# Patient Record
Sex: MALE | Race: OTHER | HISPANIC OR LATINO | URBAN - METROPOLITAN AREA
[De-identification: names, ages, dates, MRNs, and addresses within clinical notes are randomized per-mention and may not be internally consistent; named-entity substitution may affect disease eponyms.]

---

## 2018-02-12 ENCOUNTER — EMERGENCY (EMERGENCY)
Facility: HOSPITAL | Age: 58
LOS: 1 days | Discharge: HOME | End: 2018-02-12
Attending: EMERGENCY MEDICINE | Admitting: EMERGENCY MEDICINE
Payer: OTHER MISCELLANEOUS

## 2018-02-12 VITALS
SYSTOLIC BLOOD PRESSURE: 153 MMHG | HEART RATE: 50 BPM | TEMPERATURE: 98 F | DIASTOLIC BLOOD PRESSURE: 89 MMHG | OXYGEN SATURATION: 100 % | RESPIRATION RATE: 16 BRPM

## 2018-02-12 PROCEDURE — 73110 X-RAY EXAM OF WRIST: CPT | Mod: 26,LT

## 2018-02-12 PROCEDURE — 99282 EMERGENCY DEPT VISIT SF MDM: CPT

## 2018-02-12 PROCEDURE — 73130 X-RAY EXAM OF HAND: CPT | Mod: 26,LT

## 2018-02-12 RX ORDER — ACETAMINOPHEN 500 MG
650 TABLET ORAL ONCE
Qty: 0 | Refills: 0 | Status: COMPLETED | OUTPATIENT
Start: 2018-02-12 | End: 2018-02-12

## 2018-02-12 RX ORDER — IBUPROFEN 200 MG
600 TABLET ORAL ONCE
Qty: 0 | Refills: 0 | Status: COMPLETED | OUTPATIENT
Start: 2018-02-12 | End: 2018-02-12

## 2018-02-12 NOTE — ED PROVIDER NOTE - PLAN OF CARE
Follow up with your primary physician for a post hospital visit within 48 hours, taking all results from the ER to be reviewed.  You can use an ACE wrap for comfort purposes if needed. For pain control take tylenol as directed. Set up a follow up to be seen by a hand specilaist, names and numbers provided. If any sensation changes, weakness in the extremity, , worsening, concerning, or new signs or symptoms return ot the ER Follow up with your primary physician for a post hospital visit within 48 hours, taking all results from the ER to be reviewed.  You can use an ACE wrap for comfort purposes if needed. For pain control take Tylenol as directed. Set up a follow up to be seen by a hand specialist, names and numbers provided. If any sensation changes, weakness in the extremity, , worsening, concerning, or new signs or symptoms return ot the ER

## 2018-02-12 NOTE — ED PROVIDER NOTE - MEDICAL DECISION MAKING DETAILS
58 y/o M w/ left wrist pain s/p twisting injury at work. Normal exam. Plan: XR wrist and hand, pain meds and reassess.

## 2018-02-12 NOTE — ED PROVIDER NOTE - OBJECTIVE STATEMENT
56 y/o M w/ PMHx of HTN on losartan and asthma, presents to the ED c/o left wrist pain s/p twisting injury at work this morning. Pt works as an , states he was twisting wires and had a sudden onset of left wrist pain. Pt endorses similar sx in the past, but not as severe. No pain meds taken for sx.  Pt is right hand dominant. Denies numbness/tingling or any other complaints. NKDA.

## 2018-02-12 NOTE — ED PROVIDER NOTE - PROGRESS NOTE DETAILS
Wrist pain sp twisting injury at work- xray pending, will give pain control and re-eveal Declined pain meds. No pain at current time.  Xray no acute fx or dislocation hand or wrist. periarticular erosions, suggestive of RA. Will dc home with hand fu. pain control prn

## 2018-02-12 NOTE — ED PROVIDER NOTE - CARE PLAN
Principal Discharge DX:	Wrist pain  Assessment and plan of treatment:	Follow up with your primary physician for a post hospital visit within 48 hours, taking all results from the ER to be reviewed.  You can use an ACE wrap for comfort purposes if needed. For pain control take tylenol as directed. Set up a follow up to be seen by a hand specilaist, names and numbers provided. If any sensation changes, weakness in the extremity, , worsening, concerning, or new signs or symptoms return ot the ER Principal Discharge DX:	Wrist pain  Assessment and plan of treatment:	Follow up with your primary physician for a post hospital visit within 48 hours, taking all results from the ER to be reviewed.  You can use an ACE wrap for comfort purposes if needed. For pain control take Tylenol as directed. Set up a follow up to be seen by a hand specialist, names and numbers provided. If any sensation changes, weakness in the extremity, , worsening, concerning, or new signs or symptoms return ot the ER

## 2018-06-29 PROBLEM — Z00.00 ENCOUNTER FOR PREVENTIVE HEALTH EXAMINATION: Status: ACTIVE | Noted: 2018-06-29

## 2018-07-12 ENCOUNTER — RECORD ABSTRACTING (OUTPATIENT)
Age: 58
End: 2018-07-12

## 2018-07-12 DIAGNOSIS — Z82.49 FAMILY HISTORY OF ISCHEMIC HEART DISEASE AND OTHER DISEASES OF THE CIRCULATORY SYSTEM: ICD-10-CM

## 2018-07-12 DIAGNOSIS — L29.9 PRURITUS, UNSPECIFIED: ICD-10-CM

## 2018-07-12 RX ORDER — ERGOCALCIFEROL 1.25 MG/1
1.25 MG CAPSULE ORAL
Refills: 0 | Status: ACTIVE | COMMUNITY

## 2018-07-18 ENCOUNTER — APPOINTMENT (OUTPATIENT)
Dept: PULMONOLOGY | Facility: CLINIC | Age: 58
End: 2018-07-18
Payer: COMMERCIAL

## 2018-07-18 VITALS
OXYGEN SATURATION: 95 % | HEART RATE: 53 BPM | WEIGHT: 279 LBS | SYSTOLIC BLOOD PRESSURE: 156 MMHG | HEIGHT: 61 IN | BODY MASS INDEX: 52.67 KG/M2 | DIASTOLIC BLOOD PRESSURE: 83 MMHG | RESPIRATION RATE: 16 BRPM

## 2018-07-18 PROCEDURE — 99213 OFFICE O/P EST LOW 20 MIN: CPT

## 2018-10-10 ENCOUNTER — APPOINTMENT (OUTPATIENT)
Dept: PULMONOLOGY | Facility: CLINIC | Age: 58
End: 2018-10-10
Payer: COMMERCIAL

## 2018-10-10 VITALS
WEIGHT: 282 LBS | OXYGEN SATURATION: 97 % | TEMPERATURE: 98.8 F | HEIGHT: 61 IN | DIASTOLIC BLOOD PRESSURE: 86 MMHG | SYSTOLIC BLOOD PRESSURE: 138 MMHG | RESPIRATION RATE: 16 BRPM | HEART RATE: 74 BPM | BODY MASS INDEX: 53.24 KG/M2

## 2018-10-10 DIAGNOSIS — Z23 ENCOUNTER FOR IMMUNIZATION: ICD-10-CM

## 2018-10-10 PROCEDURE — 90686 IIV4 VACC NO PRSV 0.5 ML IM: CPT

## 2018-10-10 PROCEDURE — G0008: CPT

## 2018-10-10 PROCEDURE — 99213 OFFICE O/P EST LOW 20 MIN: CPT | Mod: 25

## 2019-01-09 ENCOUNTER — APPOINTMENT (OUTPATIENT)
Dept: PULMONOLOGY | Facility: CLINIC | Age: 59
End: 2019-01-09
Payer: COMMERCIAL

## 2019-01-09 VITALS
DIASTOLIC BLOOD PRESSURE: 81 MMHG | OXYGEN SATURATION: 97 % | HEART RATE: 60 BPM | SYSTOLIC BLOOD PRESSURE: 145 MMHG | TEMPERATURE: 98.7 F

## 2019-01-09 PROCEDURE — 99213 OFFICE O/P EST LOW 20 MIN: CPT | Mod: 25

## 2019-01-09 PROCEDURE — 88738 HGB QUANT TRANSCUTANEOUS: CPT

## 2019-01-09 PROCEDURE — 94727 GAS DIL/WSHOT DETER LNG VOL: CPT

## 2019-01-09 PROCEDURE — 94060 EVALUATION OF WHEEZING: CPT

## 2019-01-09 PROCEDURE — 94729 DIFFUSING CAPACITY: CPT

## 2019-01-10 LAB — POCT - HEMOGLOBIN (HGB), QUANTITATIVE, TRANSCUTANEOUS: 13.9

## 2019-01-10 NOTE — PHYSICAL EXAM
[General Appearance - Well Developed] : well developed [Normal Appearance] : normal appearance [Well Groomed] : well groomed [General Appearance - Well Nourished] : well nourished [No Deformities] : no deformities [General Appearance - In No Acute Distress] : no acute distress [Heart Rate And Rhythm] : heart rate and rhythm were normal [Heart Sounds] : normal S1 and S2 [Murmurs] : no murmurs present [Respiration, Rhythm And Depth] : normal respiratory rhythm and effort [Exaggerated Use Of Accessory Muscles For Inspiration] : no accessory muscle use [Auscultation Breath Sounds / Voice Sounds] : lungs were clear to auscultation bilaterally [Abdomen Soft] : soft [Abdomen Tenderness] : non-tender [Abdomen Mass (___ Cm)] : no abdominal mass palpated [Nail Clubbing] : no clubbing of the fingernails [Cyanosis, Localized] : no localized cyanosis [] : no ischemic changes [Petechial Hemorrhages (___cm)] : no petechial hemorrhages

## 2019-01-10 NOTE — PROCEDURE
[FreeTextEntry1] : APAP compliance reviewed with pt\par \par Pulmonary Function Test: Lung Volume: Within normal limits; Spirometry: Within normal limits with no improvement post bronchodilator; Diffusion: Within normal limits.\par

## 2019-01-10 NOTE — REASON FOR VISIT
[Follow-Up] : a follow-up visit [Shortness of Breath] : shortness of Breath [Hypersomnolence] : hypersomnolence  [Sleep Apnea] : sleep apnea [FreeTextEntry2] : weight gain,  BLUE

## 2019-01-10 NOTE — ASSESSMENT
[FreeTextEntry1] : Patient is compliant and benefiting from APAP usage.\par Advised pt on weight loss, and Cardio f/u with Dr. Matthews

## 2019-04-02 ENCOUNTER — APPOINTMENT (OUTPATIENT)
Dept: PULMONOLOGY | Facility: CLINIC | Age: 59
End: 2019-04-02
Payer: COMMERCIAL

## 2019-04-02 PROCEDURE — 99214 OFFICE O/P EST MOD 30 MIN: CPT | Mod: 25

## 2019-04-02 PROCEDURE — 94729 DIFFUSING CAPACITY: CPT

## 2019-04-02 PROCEDURE — 88738 HGB QUANT TRANSCUTANEOUS: CPT

## 2019-04-02 PROCEDURE — 94727 GAS DIL/WSHOT DETER LNG VOL: CPT

## 2019-04-02 PROCEDURE — 94060 EVALUATION OF WHEEZING: CPT

## 2019-04-02 NOTE — PROCEDURE
[FreeTextEntry1] : Pulmonary Function Test: Lung Volume: Within normal limits; Spirometry: Within normal limits with some improvement post bronchodilator; Diffusion: Within normal limits.\par \par Auto-titrating Positive Airway Pressure(APAP) compliance reviewed with patient in office today\par

## 2019-04-02 NOTE — REASON FOR VISIT
[Follow-Up] : a follow-up visit [Hypersomnolence] : hypersomnolence  [Shortness of Breath] : shortness of Breath [Sleep Apnea] : sleep apnea

## 2019-07-03 ENCOUNTER — RX RENEWAL (OUTPATIENT)
Age: 59
End: 2019-07-03

## 2019-07-10 ENCOUNTER — APPOINTMENT (OUTPATIENT)
Dept: PULMONOLOGY | Facility: CLINIC | Age: 59
End: 2019-07-10
Payer: COMMERCIAL

## 2019-07-10 VITALS
OXYGEN SATURATION: 98 % | WEIGHT: 281 LBS | HEIGHT: 73.5 IN | SYSTOLIC BLOOD PRESSURE: 148 MMHG | TEMPERATURE: 98.6 F | BODY MASS INDEX: 36.45 KG/M2 | HEART RATE: 62 BPM | RESPIRATION RATE: 16 BRPM | DIASTOLIC BLOOD PRESSURE: 88 MMHG

## 2019-07-10 PROCEDURE — 99213 OFFICE O/P EST LOW 20 MIN: CPT

## 2019-07-11 NOTE — PROCEDURE
[FreeTextEntry1] : APAP data downloaded and discussed with patient.\par \par Patient benefiting and compliant with CPAP.

## 2019-07-11 NOTE — PHYSICAL EXAM
[Normal Appearance] : normal appearance [General Appearance - Well Developed] : well developed [General Appearance - Well Nourished] : well nourished [Well Groomed] : well groomed [No Deformities] : no deformities [Heart Sounds] : normal S1 and S2 [General Appearance - In No Acute Distress] : no acute distress [Heart Rate And Rhythm] : heart rate was normal and rhythm regular [Murmurs] : no murmurs [Heart Sounds Pericardial Friction Rub] : no pericardial rub [Heart Sounds Gallop] : no gallops [Auscultation Breath Sounds / Voice Sounds] : lungs were clear to auscultation bilaterally [Nail Clubbing] : no clubbing of the fingernails [Cyanosis, Localized] : no localized cyanosis [Petechial Hemorrhages (___cm)] : no petechial hemorrhages [] : no ischemic changes

## 2019-07-16 ENCOUNTER — RX RENEWAL (OUTPATIENT)
Age: 59
End: 2019-07-16

## 2019-10-09 ENCOUNTER — APPOINTMENT (OUTPATIENT)
Dept: PULMONOLOGY | Facility: CLINIC | Age: 59
End: 2019-10-09
Payer: COMMERCIAL

## 2019-10-09 VITALS
HEIGHT: 73.5 IN | SYSTOLIC BLOOD PRESSURE: 142 MMHG | BODY MASS INDEX: 36.19 KG/M2 | HEART RATE: 60 BPM | RESPIRATION RATE: 15 BRPM | OXYGEN SATURATION: 98 % | WEIGHT: 279 LBS | DIASTOLIC BLOOD PRESSURE: 80 MMHG

## 2019-10-09 PROCEDURE — 99213 OFFICE O/P EST LOW 20 MIN: CPT

## 2019-10-10 NOTE — PHYSICAL EXAM
[General Appearance - Well Developed] : well developed [Normal Appearance] : normal appearance [Well Groomed] : well groomed [General Appearance - Well Nourished] : well nourished [No Deformities] : no deformities [General Appearance - In No Acute Distress] : no acute distress [Heart Rate And Rhythm] : heart rate was normal and rhythm regular [Heart Sounds] : normal S1 and S2 [Murmurs] : no murmurs [Heart Sounds Gallop] : no gallops [Heart Sounds Pericardial Friction Rub] : no pericardial rub [Auscultation Breath Sounds / Voice Sounds] : lungs were clear to auscultation bilaterally [Nail Clubbing] : no clubbing of the fingernails [Cyanosis, Localized] : no localized cyanosis [] : no ischemic changes [Petechial Hemorrhages (___cm)] : no petechial hemorrhages

## 2019-10-10 NOTE — PROCEDURE
[FreeTextEntry1] : APAP data downloaded and discussed with patient.\par \par Patient compliant and benefiting from APAP use.\par

## 2020-01-27 ENCOUNTER — APPOINTMENT (OUTPATIENT)
Dept: PULMONOLOGY | Facility: CLINIC | Age: 60
End: 2020-01-27
Payer: COMMERCIAL

## 2020-01-27 VITALS
RESPIRATION RATE: 16 BRPM | HEART RATE: 53 BPM | WEIGHT: 288 LBS | DIASTOLIC BLOOD PRESSURE: 83 MMHG | BODY MASS INDEX: 37.36 KG/M2 | OXYGEN SATURATION: 98 % | SYSTOLIC BLOOD PRESSURE: 143 MMHG | HEIGHT: 73.5 IN

## 2020-01-27 LAB
ALBUMIN SERPL ELPH-MCNC: 5 G/DL
ALP BLD-CCNC: 76 U/L
ALT SERPL-CCNC: 23 U/L
ANION GAP SERPL CALC-SCNC: 14 MMOL/L
AST SERPL-CCNC: 18 U/L
BASOPHILS # BLD AUTO: 0.03 K/UL
BASOPHILS NFR BLD AUTO: 0.6 %
BILIRUB SERPL-MCNC: 0.3 MG/DL
BUN SERPL-MCNC: 13 MG/DL
CALCIUM SERPL-MCNC: 9.5 MG/DL
CHLORIDE SERPL-SCNC: 102 MMOL/L
CO2 SERPL-SCNC: 25 MMOL/L
CREAT SERPL-MCNC: 0.97 MG/DL
EOSINOPHIL # BLD AUTO: 0.19 K/UL
EOSINOPHIL NFR BLD AUTO: 3.6 %
ERYTHROCYTE [SEDIMENTATION RATE] IN BLOOD BY WESTERGREN METHOD: 29 MM/HR
GLUCOSE SERPL-MCNC: 102 MG/DL
HCT VFR BLD CALC: 41.2 %
HGB BLD-MCNC: 14.3 G/DL
IMM GRANULOCYTES NFR BLD AUTO: 0.8 %
LYMPHOCYTES # BLD AUTO: 2.05 K/UL
LYMPHOCYTES NFR BLD AUTO: 38.7 %
MAN DIFF?: NORMAL
MCHC RBC-ENTMCNC: 30.3 PG
MCHC RBC-ENTMCNC: 34.7 GM/DL
MCV RBC AUTO: 87.3 FL
MONOCYTES # BLD AUTO: 0.5 K/UL
MONOCYTES NFR BLD AUTO: 9.4 %
NEUTROPHILS # BLD AUTO: 2.49 K/UL
NEUTROPHILS NFR BLD AUTO: 46.9 %
PLATELET # BLD AUTO: 206 K/UL
POCT - HEMOGLOBIN (HGB), QUANTITATIVE, TRANSCUTANEOUS: 13.7
POTASSIUM SERPL-SCNC: 4.1 MMOL/L
PROT SERPL-MCNC: 7.6 G/DL
RBC # BLD: 4.72 M/UL
RBC # FLD: 13 %
SODIUM SERPL-SCNC: 142 MMOL/L
WBC # FLD AUTO: 5.3 K/UL

## 2020-01-27 PROCEDURE — 88738 HGB QUANT TRANSCUTANEOUS: CPT

## 2020-01-27 PROCEDURE — 36415 COLL VENOUS BLD VENIPUNCTURE: CPT

## 2020-01-27 PROCEDURE — 99214 OFFICE O/P EST MOD 30 MIN: CPT | Mod: 25

## 2020-01-27 PROCEDURE — 95012 NITRIC OXIDE EXP GAS DETER: CPT

## 2020-01-27 PROCEDURE — 94729 DIFFUSING CAPACITY: CPT

## 2020-01-27 PROCEDURE — 71046 X-RAY EXAM CHEST 2 VIEWS: CPT

## 2020-01-27 PROCEDURE — 94727 GAS DIL/WSHOT DETER LNG VOL: CPT

## 2020-01-27 PROCEDURE — 94060 EVALUATION OF WHEEZING: CPT

## 2020-01-28 NOTE — REASON FOR VISIT
[Follow-Up] : a follow-up visit [Hypersomnolence] : hypersomnolence  [Cough] : cough [Sleep Apnea] : sleep apnea

## 2020-01-28 NOTE — PROCEDURE
[FreeTextEntry1] : APAP data downloaded and discussed with patient.\par \par Patient compliant and benefiting from APAP use.\par \par Chest x-ray PA and lateral views performed in my office today showed clear lungs, no evidence of infiltrates or pleural effusions.\par \par Pulmonary Function Test: Lung Volume: Within normal limits; Spirometry: Within normal limits with some improvement post bronchodilator; Diffusion: Within normal limits.\par

## 2020-01-28 NOTE — PHYSICAL EXAM
[General Appearance - Well Developed] : well developed [Normal Appearance] : normal appearance [Well Groomed] : well groomed [General Appearance - Well Nourished] : well nourished [No Deformities] : no deformities [Heart Rate And Rhythm] : heart rate was normal and rhythm regular [General Appearance - In No Acute Distress] : no acute distress [Heart Sounds] : normal S1 and S2 [Heart Sounds Gallop] : no gallops [Murmurs] : no murmurs [Heart Sounds Pericardial Friction Rub] : no pericardial rub [Nail Clubbing] : no clubbing of the fingernails [Auscultation Breath Sounds / Voice Sounds] : lungs were clear to auscultation bilaterally [Cyanosis, Localized] : no localized cyanosis [Petechial Hemorrhages (___cm)] : no petechial hemorrhages [] : no ischemic changes

## 2020-01-28 NOTE — ASSESSMENT
[FreeTextEntry1] : Start Z-Costa and Medrol Dosepak for acute asthmatic bronchitis\par Patient is compliant and benefiting from APAP usage.\par \par

## 2020-02-03 LAB
ANION GAP SERPL CALC-SCNC: 19 MMOL/L
BUN SERPL-MCNC: 13 MG/DL
CALCIUM SERPL-MCNC: 9.5 MG/DL
CHLORIDE SERPL-SCNC: 103 MMOL/L
CO2 SERPL-SCNC: 21 MMOL/L
CREAT SERPL-MCNC: 0.93 MG/DL
DEPRECATED D DIMER PPP IA-ACNC: <150 NG/ML DDU
GLUCOSE SERPL-MCNC: 103 MG/DL
POTASSIUM SERPL-SCNC: 4.1 MMOL/L
SODIUM SERPL-SCNC: 143 MMOL/L

## 2020-02-10 ENCOUNTER — APPOINTMENT (OUTPATIENT)
Dept: PULMONOLOGY | Facility: CLINIC | Age: 60
End: 2020-02-10
Payer: COMMERCIAL

## 2020-02-10 VITALS
RESPIRATION RATE: 16 BRPM | SYSTOLIC BLOOD PRESSURE: 147 MMHG | WEIGHT: 288 LBS | HEIGHT: 73.5 IN | BODY MASS INDEX: 37.36 KG/M2 | DIASTOLIC BLOOD PRESSURE: 76 MMHG | HEART RATE: 55 BPM | OXYGEN SATURATION: 97 %

## 2020-02-10 PROCEDURE — 94060 EVALUATION OF WHEEZING: CPT

## 2020-02-10 PROCEDURE — 99214 OFFICE O/P EST MOD 30 MIN: CPT | Mod: 25

## 2020-02-10 NOTE — REASON FOR VISIT
[Cough] : cough [Hypersomnolence] : hypersomnolence  [Follow-Up] : a follow-up visit [Sleep Apnea] : sleep apnea [FreeTextEntry2] : cough is significantly better at this point

## 2020-02-10 NOTE — PHYSICAL EXAM
[Normal Appearance] : normal appearance [General Appearance - Well Developed] : well developed [Well Groomed] : well groomed [No Deformities] : no deformities [General Appearance - Well Nourished] : well nourished [Heart Sounds] : normal S1 and S2 [General Appearance - In No Acute Distress] : no acute distress [Heart Rate And Rhythm] : heart rate was normal and rhythm regular [Heart Sounds Gallop] : no gallops [Murmurs] : no murmurs [Heart Sounds Pericardial Friction Rub] : no pericardial rub [Auscultation Breath Sounds / Voice Sounds] : lungs were clear to auscultation bilaterally [Nail Clubbing] : no clubbing of the fingernails [Petechial Hemorrhages (___cm)] : no petechial hemorrhages [Cyanosis, Localized] : no localized cyanosis [] : no ischemic changes

## 2020-09-18 ENCOUNTER — RX RENEWAL (OUTPATIENT)
Age: 60
End: 2020-09-18

## 2020-09-29 ENCOUNTER — LABORATORY RESULT (OUTPATIENT)
Age: 60
End: 2020-09-29

## 2020-09-29 ENCOUNTER — APPOINTMENT (OUTPATIENT)
Dept: PULMONOLOGY | Facility: CLINIC | Age: 60
End: 2020-09-29
Payer: COMMERCIAL

## 2020-09-29 VITALS
WEIGHT: 295 LBS | SYSTOLIC BLOOD PRESSURE: 131 MMHG | BODY MASS INDEX: 39.1 KG/M2 | HEART RATE: 51 BPM | TEMPERATURE: 98 F | DIASTOLIC BLOOD PRESSURE: 76 MMHG | OXYGEN SATURATION: 95 % | HEIGHT: 73 IN

## 2020-09-29 PROCEDURE — 36415 COLL VENOUS BLD VENIPUNCTURE: CPT

## 2020-09-29 PROCEDURE — 71046 X-RAY EXAM CHEST 2 VIEWS: CPT

## 2020-09-29 PROCEDURE — 99215 OFFICE O/P EST HI 40 MIN: CPT | Mod: 25

## 2020-09-29 NOTE — REASON FOR VISIT
[Follow-Up] : a follow-up visit [Hypersomnolence] : hypersomnolence [Shortness of Breath] : shortness of breath [TextBox_44] : Complains of exertional dyspnea

## 2020-09-29 NOTE — PROCEDURE
[FreeTextEntry1] : \par  Xray Chest 2 Views PA/Lat             Final\par \par No Documents Attached\par \par \par \par \par   \par Chest x-ray PA and lateral views performed in my office today showed mild pulmonary vascular congestion/CHF, no evidence of pleural effusions. \par \par \par  Ordered by: ЮЛИЯ AQUINO       Collected/Examined: 29Sep2020 06:39PM       \par Verified by: ЮЛИЯ AQUINO 29Sep2020 06:39PM       \par  Result Communication: No patient communication needed at this time;\par Stage: Final       \par  Performed at: In Office       Performed by: ЮЛИЯ AQUINO       Resulted: 29Sep2020 06:39PM       Last Updated: 29Sep2020 06:39PM       Accession: [id number]

## 2020-09-29 NOTE — ASSESSMENT
[FreeTextEntry1] : Start Lasix 20 mg p.o. daily\par Medications reviewed. Continue present medications.\par Venipuncture with labs drawn in office\par Auto-titrating Positive Airway Pressure(APAP) compliance reviewed with patient in office today\par Patient is compliant and benefiting from APAP usage.\par Return in 1 week for follow-up

## 2020-10-07 ENCOUNTER — APPOINTMENT (OUTPATIENT)
Dept: PULMONOLOGY | Facility: CLINIC | Age: 60
End: 2020-10-07
Payer: COMMERCIAL

## 2020-10-07 VITALS
RESPIRATION RATE: 15 BRPM | TEMPERATURE: 98.1 F | HEART RATE: 56 BPM | OXYGEN SATURATION: 95 % | SYSTOLIC BLOOD PRESSURE: 147 MMHG | DIASTOLIC BLOOD PRESSURE: 85 MMHG

## 2020-10-07 PROCEDURE — 99214 OFFICE O/P EST MOD 30 MIN: CPT

## 2020-10-08 NOTE — HISTORY OF PRESENT ILLNESS
[TextBox_4] : 1wk f/u, was started on 20mg of Lasix after noted fluid around the heart. Does state some improvement in dyspnea since starting Lasix but still having dyspnea with exertion.

## 2020-10-15 LAB
ALBUMIN SERPL ELPH-MCNC: 4.7 G/DL
ALP BLD-CCNC: 84 U/L
ALT SERPL-CCNC: 34 U/L
ANION GAP SERPL CALC-SCNC: 14 MMOL/L
AST SERPL-CCNC: 30 U/L
BASOPHILS # BLD AUTO: 0.04 K/UL
BASOPHILS NFR BLD AUTO: 0.6 %
BILIRUB SERPL-MCNC: 0.3 MG/DL
BUN SERPL-MCNC: 19 MG/DL
CALCIUM SERPL-MCNC: 10 MG/DL
CHLORIDE SERPL-SCNC: 107 MMOL/L
CK MB BLD-MCNC: 6.1 NG/ML
CK SERPL-CCNC: 390 U/L
CO2 SERPL-SCNC: 25 MMOL/L
CREAT SERPL-MCNC: 1.06 MG/DL
DEPRECATED D DIMER PPP IA-ACNC: <150 NG/ML DDU
EOSINOPHIL # BLD AUTO: 0.2 K/UL
EOSINOPHIL NFR BLD AUTO: 3.2 %
ERYTHROCYTE [SEDIMENTATION RATE] IN BLOOD BY WESTERGREN METHOD: 7 MM/HR
GLUCOSE SERPL-MCNC: 91 MG/DL
HCT VFR BLD CALC: 40.2 %
HGB BLD-MCNC: 13.3 G/DL
IMM GRANULOCYTES NFR BLD AUTO: 0.6 %
LYMPHOCYTES # BLD AUTO: 2.47 K/UL
LYMPHOCYTES NFR BLD AUTO: 39.5 %
MAN DIFF?: NORMAL
MCHC RBC-ENTMCNC: 30.2 PG
MCHC RBC-ENTMCNC: 33.1 GM/DL
MCV RBC AUTO: 91.2 FL
MONOCYTES # BLD AUTO: 0.67 K/UL
MONOCYTES NFR BLD AUTO: 10.7 %
NEUTROPHILS # BLD AUTO: 2.83 K/UL
NEUTROPHILS NFR BLD AUTO: 45.4 %
NT-PROBNP SERPL-MCNC: 16 PG/ML
PLATELET # BLD AUTO: 207 K/UL
POTASSIUM SERPL-SCNC: 4 MMOL/L
PROT SERPL-MCNC: 7.4 G/DL
RBC # BLD: 4.41 M/UL
RBC # FLD: 13.2 %
SARS-COV-2 IGG SERPL IA-ACNC: <0.1 INDEX
SARS-COV-2 IGG SERPL QL IA: NEGATIVE
SODIUM SERPL-SCNC: 146 MMOL/L
WBC # FLD AUTO: 6.25 K/UL

## 2020-11-24 ENCOUNTER — APPOINTMENT (OUTPATIENT)
Dept: PULMONOLOGY | Facility: CLINIC | Age: 60
End: 2020-11-24
Payer: COMMERCIAL

## 2020-11-24 VITALS
DIASTOLIC BLOOD PRESSURE: 77 MMHG | TEMPERATURE: 98.1 F | SYSTOLIC BLOOD PRESSURE: 154 MMHG | HEART RATE: 60 BPM | OXYGEN SATURATION: 98 % | RESPIRATION RATE: 15 BRPM

## 2020-11-24 PROCEDURE — 99214 OFFICE O/P EST MOD 30 MIN: CPT | Mod: 25

## 2020-11-24 PROCEDURE — 71046 X-RAY EXAM CHEST 2 VIEWS: CPT

## 2020-11-24 NOTE — PROCEDURE
[FreeTextEntry1] : Auto-titrating Positive Airway Pressure(APAP) compliance reviewed with patient in office today\par \par  Xray Chest 2 Views PA/Lat             Final\par   \par Chest x-ray PA and lateral views performed in my office today showed clear lungs, no evidence of infiltrates or pleural effusions. \par \par \par  Ordered by: ЮЛИЯ AQUINO       Collected/Examined: 24Nov2020 03:44PM       \par Verification Required       Stage: Final       \par  Performed at: In Office       Performed by: ЮЛИЯ AQUINO       Resulted: 24Nov2020 03:44PM       Last Updated: 24Nov2020 03:44PM

## 2020-11-24 NOTE — ASSESSMENT
[FreeTextEntry1] : Patient is compliant and benefiting from APAP usage.\par Medications reviewed. Continue present medications.\par

## 2020-11-24 NOTE — REASON FOR VISIT
[Follow-Up] : a follow-up visit [Cough] : cough [Shortness of Breath] : shortness of breath [TextBox_44] : Had cough and SOB earlier for 1 week, but symptoms resolved now

## 2021-01-26 ENCOUNTER — APPOINTMENT (OUTPATIENT)
Dept: PULMONOLOGY | Facility: CLINIC | Age: 61
End: 2021-01-26
Payer: COMMERCIAL

## 2021-01-26 VITALS
HEART RATE: 55 BPM | OXYGEN SATURATION: 98 % | DIASTOLIC BLOOD PRESSURE: 85 MMHG | TEMPERATURE: 98.2 F | SYSTOLIC BLOOD PRESSURE: 135 MMHG | RESPIRATION RATE: 15 BRPM

## 2021-01-26 PROCEDURE — 99072 ADDL SUPL MATRL&STAF TM PHE: CPT

## 2021-01-26 PROCEDURE — 99214 OFFICE O/P EST MOD 30 MIN: CPT

## 2021-03-24 ENCOUNTER — APPOINTMENT (OUTPATIENT)
Dept: PULMONOLOGY | Facility: CLINIC | Age: 61
End: 2021-03-24
Payer: COMMERCIAL

## 2021-03-24 VITALS
DIASTOLIC BLOOD PRESSURE: 78 MMHG | RESPIRATION RATE: 15 BRPM | SYSTOLIC BLOOD PRESSURE: 138 MMHG | HEART RATE: 53 BPM | OXYGEN SATURATION: 97 % | TEMPERATURE: 98.6 F

## 2021-03-24 PROCEDURE — 99072 ADDL SUPL MATRL&STAF TM PHE: CPT

## 2021-03-24 PROCEDURE — 99214 OFFICE O/P EST MOD 30 MIN: CPT

## 2021-03-24 NOTE — PHYSICAL EXAM
[General Appearance - Well Developed] : well developed [Normal Appearance] : normal appearance [Well Groomed] : well groomed [General Appearance - Well Nourished] : well nourished [No Deformities] : no deformities [General Appearance - In No Acute Distress] : no acute distress [Heart Rate And Rhythm] : heart rate was normal and rhythm regular [Heart Sounds] : normal S1 and S2 [Heart Sounds Gallop] : no gallops [Murmurs] : no murmurs [Heart Sounds Pericardial Friction Rub] : no pericardial rub [Auscultation Breath Sounds / Voice Sounds] : lungs were clear to auscultation bilaterally [Nail Clubbing] : no clubbing of the fingernails [Cyanosis, Localized] : no localized cyanosis [Petechial Hemorrhages (___cm)] : no petechial hemorrhages [] : no ischemic changes

## 2021-03-25 NOTE — HISTORY OF PRESENT ILLNESS
[FreeTextEntry1] : States overall feeling OK\par Does complain of some SOB\par States does not believe it is pulmonary related

## 2021-05-26 ENCOUNTER — EMERGENCY (EMERGENCY)
Facility: HOSPITAL | Age: 61
LOS: 1 days | Discharge: HOME | End: 2021-05-26
Admitting: EMERGENCY MEDICINE
Payer: COMMERCIAL

## 2021-05-26 VITALS
OXYGEN SATURATION: 98 % | DIASTOLIC BLOOD PRESSURE: 84 MMHG | TEMPERATURE: 99 F | SYSTOLIC BLOOD PRESSURE: 157 MMHG | HEART RATE: 99 BPM | RESPIRATION RATE: 18 BRPM

## 2021-05-26 PROCEDURE — 99284 EMERGENCY DEPT VISIT MOD MDM: CPT

## 2021-05-26 NOTE — ED ADULT TRIAGE NOTE - CHIEF COMPLAINT QUOTE
"I bit into a rib bone and my tooth is loose, I don't want to go to sleep and then swallow it" no bleeding, no pain

## 2021-05-27 NOTE — ED PROVIDER NOTE - NSFOLLOWUPINSTRUCTIONS_ED_ALL_ED_FT
Follow up with your dentist within 3-5 days  Take Tylenol 650mg every 6 hours as needed for pain  Soft diet  Return to the ER with any worsening or concerning symptoms, pain, swelling, fever or any other concerns.

## 2021-05-27 NOTE — PROGRESS NOTE ADULT - SUBJECTIVE AND OBJECTIVE BOX
Dental ED Consult    MEDICATIONS  (STANDING):None    MEDICATIONS  (PRN):None    Allergies    No Known Drug Allergies  shellfish (Anaphylaxis; Short breath)    Intolerances    Vital Signs Last 24 Hrs  T(C): 37.2 (26 May 2021 22:12), Max: 37.2 (26 May 2021 22:12)  T(F): 98.9 (26 May 2021 22:12), Max: 98.9 (26 May 2021 22:12)  HR: 99 (26 May 2021 22:12) (99 - 99)  BP: 157/84 (26 May 2021 22:12) (157/84 - 157/84)  BP(mean): --  RR: 18 (26 May 2021 22:12) (18 - 18)  SpO2: 98% (26 May 2021 22:12) (98% - 98%)    LABS:N/A    Patient was eating ribs when he noticed a tooth in the LR quad was mobile. Patient was concerned about swallowing it  CLINICAL EXAM:  EOE:WNL  IOE:#29 implant crown mobile    DENTAL RADIOGRAPHS:#29/30 implants w/ crowns, #29 does not appear to be fully seated    RADIOLOGY & ADDITIONAL TESTS:N/A    ASSESSMENT:Patient requires routine dental care to address loose implant crown  PLAN: Follow up w/ outside dentist    PROCEDURE: Limited exam 1 PA  Verbal consent given.    RECOMMENDATIONS:  1) F/U with outpatient dentist for comprehensive dental care upon discharge.  2) If swelling, fever, difficulty breathing/swallowing occurs, page Dental.     Kimani Lawrence DDS, Pager #[phone]

## 2021-05-27 NOTE — ED PROVIDER NOTE - PROGRESS NOTE DETAILS
LAVERN Bauman: Spoke with dental will see pt in the ED LAVERN Bauman: Pt seen by dental, will d/c home with private dental follow up, as tooth is an implant

## 2021-05-27 NOTE — ED PROVIDER NOTE - PATIENT PORTAL LINK FT
You can access the FollowMyHealth Patient Portal offered by [facility] by registering at the following website: [email or web address]. By joining [facility]’s FollowMyHealth portal, you will also be able to view your health information using other applications (apps) compatible with our system.

## 2021-05-27 NOTE — ED PROVIDER NOTE - OBJECTIVE STATEMENT
61yM w/pmhx HTN, asthma presenting with loose tooth. Pt states he was eating ribs when he felt his right lower tooth came loose. Pt is concerned he will swallow it in his sleep 61yM w/pmhx HTN, asthma presenting with loose tooth. Pt states he was eating ribs when he felt his right lower tooth come loose. Pt is concerned he will swallow it in his sleep. Pt denies pain, gum swelling, facial swelling, headache, fever/chills, neck pain or any other concerns.

## 2021-05-27 NOTE — ED PROVIDER NOTE - CLINICAL SUMMARY MEDICAL DECISION MAKING FREE TEXT BOX
61yM w/pmhx HTN, asthma presenting with loose tooth after eating ribs. On exam tooth #28 is loose, no obvious fracture, no gum or facial swelling. Pt denies pain at present, well appearing. Will discuss with dental.

## 2021-06-02 ENCOUNTER — APPOINTMENT (OUTPATIENT)
Dept: PULMONOLOGY | Facility: CLINIC | Age: 61
End: 2021-06-02
Payer: COMMERCIAL

## 2021-06-02 VITALS
TEMPERATURE: 97.9 F | BODY MASS INDEX: 41.75 KG/M2 | OXYGEN SATURATION: 95 % | WEIGHT: 315 LBS | HEIGHT: 73 IN | DIASTOLIC BLOOD PRESSURE: 77 MMHG | SYSTOLIC BLOOD PRESSURE: 148 MMHG | HEART RATE: 63 BPM

## 2021-06-02 LAB
BASOPHILS # BLD AUTO: 0.03 K/UL
BASOPHILS NFR BLD AUTO: 0.5 %
DEPRECATED D DIMER PPP IA-ACNC: <150 NG/ML DDU
EOSINOPHIL # BLD AUTO: 0.13 K/UL
EOSINOPHIL NFR BLD AUTO: 2.3 %
HCT VFR BLD CALC: 43.6 %
HGB BLD-MCNC: 14.1 G/DL
IMM GRANULOCYTES NFR BLD AUTO: 0.5 %
LYMPHOCYTES # BLD AUTO: 1.56 K/UL
LYMPHOCYTES NFR BLD AUTO: 27.5 %
MAN DIFF?: NORMAL
MCHC RBC-ENTMCNC: 29 PG
MCHC RBC-ENTMCNC: 32.3 GM/DL
MCV RBC AUTO: 89.5 FL
MONOCYTES # BLD AUTO: 0.69 K/UL
MONOCYTES NFR BLD AUTO: 12.1 %
NEUTROPHILS # BLD AUTO: 3.24 K/UL
NEUTROPHILS NFR BLD AUTO: 57.1 %
PLATELET # BLD AUTO: 159 K/UL
RBC # BLD: 4.87 M/UL
RBC # FLD: 13.4 %
TROPONIN-T, HIGH SENSITIVITY: 21 NG/L
WBC # FLD AUTO: 5.68 K/UL

## 2021-06-02 PROCEDURE — 95012 NITRIC OXIDE EXP GAS DETER: CPT

## 2021-06-02 PROCEDURE — 99214 OFFICE O/P EST MOD 30 MIN: CPT | Mod: 25

## 2021-06-02 PROCEDURE — 99072 ADDL SUPL MATRL&STAF TM PHE: CPT

## 2021-06-02 PROCEDURE — 94010 BREATHING CAPACITY TEST: CPT

## 2021-06-02 PROCEDURE — 71046 X-RAY EXAM CHEST 2 VIEWS: CPT

## 2021-06-03 NOTE — REASON FOR VISIT
[Follow-Up] : a follow-up visit [Shortness of Breath] : shortness of breath [TextBox_44] : Complains of shortness of breath for 1 year, no chest pain or cough.

## 2021-06-03 NOTE — ASSESSMENT
[FreeTextEntry1] : Venipuncture with labs drawn in office\par Start Z-Costa and Medrol Dosepak.\par Start Asmanex 200 mcg HFA, 2 puffs twice a day.\par Return for pulmonary follow-up in 2 weeks.

## 2021-06-03 NOTE — PROCEDURE
[FreeTextEntry1] : Spirometry showed suggestive evidence of moderate restrictive defect.\par \par  Exhaled Nitric Oxide             Final\par \par No Documents Attached\par \par \par   Test   Result   Flag Reference Goal \par   Exhaled Nitric Oxide 15      \par \par  Ordered by: ЮЛИЯ AQUINO       Collected/Examined: 02Jun2021 09:59AM       \par Verified by: ЮЛИЯ AQUINO 02Jun2021 05:12PM       \par  Result Communication: No patient communication needed at this time;\par Stage: Final       \par  Performed at: Other       Performed by: ЮЛИЯ AQUINO       Resulted: 02Jun2021 09:59AM       Last Updated: 02Jun2021 05:12PM       Accession: [id number]       \par \par  Xray Chest 2 Views PA/Lat             Final\par \par \par   \par Chest x-ray PA and lateral views performed in my office today showed chronically elevated right hemidiaphragm, otherwise clear lungs, no evidence of infiltrates or pleural effusions. \par \par \par  Ordered by: ЮЛИЯ AQUINO       Collected/Examined: 02Jun2021 10:08AM       \par Verified by: ЮЛИЯ AQUINO 02Jun2021 05:12PM       \par  Result Communication: No patient communication needed at this time;\par Stage: Final       \par  Performed at: In Office       Performed by: ЮЛИЯ AQUINO       Resulted: 02Jun2021 10:08AM       Last Updated: 02Jun2021 05:12PM       Accession: [id number]

## 2021-06-03 NOTE — DISCUSSION/SUMMARY
[FreeTextEntry1] : Brent is a patient with chronic shortness of breath likely secondary to asthmatic bronchitis, rule out PE/MI.

## 2021-06-07 LAB
ANION GAP SERPL CALC-SCNC: 16 MMOL/L
BUN SERPL-MCNC: 11 MG/DL
CALCIUM SERPL-MCNC: 9.1 MG/DL
CHLORIDE SERPL-SCNC: 104 MMOL/L
CO2 SERPL-SCNC: 24 MMOL/L
CREAT SERPL-MCNC: 0.96 MG/DL
GLUCOSE SERPL-MCNC: 144 MG/DL
POTASSIUM SERPL-SCNC: 3.8 MMOL/L
SODIUM SERPL-SCNC: 143 MMOL/L

## 2021-06-23 ENCOUNTER — APPOINTMENT (OUTPATIENT)
Dept: PULMONOLOGY | Facility: CLINIC | Age: 61
End: 2021-06-23
Payer: COMMERCIAL

## 2021-06-23 VITALS
TEMPERATURE: 97.8 F | SYSTOLIC BLOOD PRESSURE: 136 MMHG | OXYGEN SATURATION: 95 % | HEIGHT: 73 IN | DIASTOLIC BLOOD PRESSURE: 71 MMHG | HEART RATE: 58 BPM

## 2021-06-23 PROCEDURE — 99072 ADDL SUPL MATRL&STAF TM PHE: CPT

## 2021-06-23 PROCEDURE — 99214 OFFICE O/P EST MOD 30 MIN: CPT

## 2021-06-23 NOTE — REASON FOR VISIT
[Follow-Up] : a follow-up visit [Asthma] : asthma [Shortness of Breath] : shortness of breath [TextBox_44] : Recurrent SOB after medrol pack,-did not take Asmanex HFA as advised.

## 2021-06-23 NOTE — ASSESSMENT
[FreeTextEntry1] : Start Z-Costa and Medrol Dosepak.\par Start Asmanex 200 mcg HFA, 2 puffs twice a day.\par Return for pulmonary follow-up in 2 weeks.\par Patient is compliant and benefiting from APAP usage.\par Medications reviewed. Continue present medications.\par

## 2021-07-07 ENCOUNTER — APPOINTMENT (OUTPATIENT)
Dept: PULMONOLOGY | Facility: CLINIC | Age: 61
End: 2021-07-07
Payer: COMMERCIAL

## 2021-07-07 VITALS
DIASTOLIC BLOOD PRESSURE: 69 MMHG | OXYGEN SATURATION: 96 % | SYSTOLIC BLOOD PRESSURE: 139 MMHG | TEMPERATURE: 97.6 F | HEART RATE: 60 BPM

## 2021-07-07 PROCEDURE — 99072 ADDL SUPL MATRL&STAF TM PHE: CPT

## 2021-07-07 PROCEDURE — 95012 NITRIC OXIDE EXP GAS DETER: CPT

## 2021-07-07 PROCEDURE — 99214 OFFICE O/P EST MOD 30 MIN: CPT | Mod: 25

## 2021-07-07 PROCEDURE — 94010 BREATHING CAPACITY TEST: CPT

## 2021-07-07 NOTE — PHYSICAL EXAM
[General Appearance - Well Developed] : well developed [Normal Appearance] : normal appearance [Well Groomed] : well groomed [General Appearance - Well Nourished] : well nourished [No Deformities] : no deformities [General Appearance - In No Acute Distress] : no acute distress [Heart Sounds] : normal S1 and S2 [Heart Rate And Rhythm] : heart rate was normal and rhythm regular [Heart Sounds Gallop] : no gallops [Murmurs] : no murmurs [Heart Sounds Pericardial Friction Rub] : no pericardial rub [Auscultation Breath Sounds / Voice Sounds] : lungs were clear to auscultation bilaterally [Nail Clubbing] : no clubbing of the fingernails [Cyanosis, Localized] : no localized cyanosis [Petechial Hemorrhages (___cm)] : no petechial hemorrhages [] : no ischemic changes

## 2021-07-08 NOTE — ASSESSMENT
[FreeTextEntry1] : Patient is compliant and benefiting from APAP usage.\par Discussed with Brent regarding [employer] recalls, advised him to go on website to get new replacement.\par His Dreamstation has defects, will change to ResMed at 5 to 15 cm H2O.\par Decrease Asmanex HFA to 1 puff twice daily.  \par Medications reviewed. Continue present medications.\par

## 2021-07-08 NOTE — PROCEDURE
[FreeTextEntry1] : Spirometry shows suggestive evidence of mild restrictive defect.\par \par  Exhaled Nitric Oxide             Final\par \par No Documents Attached\par \par \par   Test   Result   Flag Reference Goal Last Verified \par   Exhaled Nitric Oxide 13      REQUIRED \par \par  Ordered by: ЮЛИЯ AQUINO       Collected/Examined: [id number] 10:22AM       \par Verification Required       Stage: Final       \par  Performed at: Other       Performed by: ЮЛИЯ AQUINO       Resulted: [id number] 10:22AM       Last Updated: [id number] 10:22AM       \par

## 2021-08-12 ENCOUNTER — APPOINTMENT (OUTPATIENT)
Dept: PULMONOLOGY | Facility: CLINIC | Age: 61
End: 2021-08-12
Payer: COMMERCIAL

## 2021-08-12 VITALS
HEART RATE: 53 BPM | DIASTOLIC BLOOD PRESSURE: 78 MMHG | OXYGEN SATURATION: 95 % | SYSTOLIC BLOOD PRESSURE: 146 MMHG | TEMPERATURE: 98.3 F

## 2021-08-12 PROCEDURE — 94727 GAS DIL/WSHOT DETER LNG VOL: CPT

## 2021-08-12 PROCEDURE — 99214 OFFICE O/P EST MOD 30 MIN: CPT | Mod: 25

## 2021-08-12 PROCEDURE — 94010 BREATHING CAPACITY TEST: CPT

## 2021-08-12 PROCEDURE — 94729 DIFFUSING CAPACITY: CPT

## 2021-08-12 PROCEDURE — 88738 HGB QUANT TRANSCUTANEOUS: CPT

## 2021-08-12 PROCEDURE — ZZZZZ: CPT

## 2021-08-15 NOTE — PROCEDURE
[FreeTextEntry1] : PFT performed the office today: Spirometry shows suggestive evidence of mild restrictive defect; lung volume is within normal limits with air trapping; diffusion is within normal limits.

## 2021-08-15 NOTE — ASSESSMENT
[FreeTextEntry1] : Continue Asmanex HFA\par Patient is compliant and benefiting from APAP usage.\par

## 2021-10-20 ENCOUNTER — APPOINTMENT (OUTPATIENT)
Dept: PULMONOLOGY | Facility: CLINIC | Age: 61
End: 2021-10-20
Payer: COMMERCIAL

## 2021-10-20 VITALS
DIASTOLIC BLOOD PRESSURE: 79 MMHG | OXYGEN SATURATION: 96 % | SYSTOLIC BLOOD PRESSURE: 136 MMHG | TEMPERATURE: 97.7 F | HEART RATE: 59 BPM | RESPIRATION RATE: 15 BRPM

## 2021-10-20 LAB — SARS-COV-2 AG RESP QL IA.RAPID: NEGATIVE

## 2021-10-20 PROCEDURE — 88738 HGB QUANT TRANSCUTANEOUS: CPT

## 2021-10-20 PROCEDURE — 94729 DIFFUSING CAPACITY: CPT

## 2021-10-20 PROCEDURE — ZZZZZ: CPT

## 2021-10-20 PROCEDURE — 94010 BREATHING CAPACITY TEST: CPT

## 2021-10-20 PROCEDURE — 36415 COLL VENOUS BLD VENIPUNCTURE: CPT

## 2021-10-20 PROCEDURE — 94727 GAS DIL/WSHOT DETER LNG VOL: CPT

## 2021-10-20 PROCEDURE — 71046 X-RAY EXAM CHEST 2 VIEWS: CPT

## 2021-10-20 PROCEDURE — 99214 OFFICE O/P EST MOD 30 MIN: CPT | Mod: 25

## 2021-10-20 PROCEDURE — 87811 SARS-COV-2 COVID19 W/OPTIC: CPT

## 2021-10-20 NOTE — ASSESSMENT
[FreeTextEntry1] : Venipuncture with labs drawn in office\par Will switch from Asmanex to Dulera 200/25 mcg HFA, 2 puffs twice daily at this point.\par Return for pulmonary follow-up in 2 weeks.

## 2021-10-20 NOTE — PROCEDURE
[FreeTextEntry1] : Pulmonary function test performed in my office today: Spirometry shows suggestive evidence of mild restrictive defect; lung volume is within normal limits; diffusion is within normal limits.\par \par \par  POCT COVID-19 (Binax Rapid Antigen Card)             Final\par \par No Documents Attached\par \par \par   Test   Result   Flag Reference Goal \par   POCT COVID-19 (Binax Rapid Antigen Card) Negative      \par \par  Ordered by: ЮЛИЯ AQUINO       Collected/Examined: 20Oct2021 01:53PM       \par Verified by: ЮЛИЯ AQUINO 20Oct2021 08:39PM       \par  Result Communication: No patient communication needed at this time;\par Stage: Final       \par  Performed at: In Office       Performed by: ЮЛИЯ AQUINO       Resulted: 20Oct2021 01:53PM       Last Updated: 20Oct2021 08:39PM       Accession: [id number]       \par \par  Xray Chest 2 Views PA/Lat             Final\par \par No Documents Attached\par \par \par \par \par   \par Chest x-ray PA and lateral views performed in my office today showed unchanged mildly elevated right hemidiaphragm, clear lungs, no evidence of infiltrates or pleural effusions. \par \par \par  Ordered by: ЮЛИЯ AQUINO       Collected/Examined: 20Oct2021 02:26PM       \par Verified by: ЮЛИЯ AQUINO 20Oct2021 08:39PM       \par  Result Communication: No patient communication needed at this time;\par Stage: Final       \par  Performed at: In Office       Performed by: ЮЛИЯ AQUINO       Resulted: 20Oct2021 02:26PM       Last Updated: 20Oct2021 08:39PM       Accession: [id number]

## 2021-10-24 LAB
ALBUMIN SERPL ELPH-MCNC: 4.8 G/DL
ALP BLD-CCNC: 54 U/L
ALT SERPL-CCNC: 32 U/L
ANION GAP SERPL CALC-SCNC: 17 MMOL/L
AST SERPL-CCNC: 21 U/L
BASOPHILS # BLD AUTO: 0.03 K/UL
BASOPHILS NFR BLD AUTO: 0.5 %
BILIRUB SERPL-MCNC: 0.5 MG/DL
BUN SERPL-MCNC: 10 MG/DL
CALCIUM SERPL-MCNC: 9.7 MG/DL
CHLORIDE SERPL-SCNC: 103 MMOL/L
CO2 SERPL-SCNC: 26 MMOL/L
CREAT SERPL-MCNC: 0.91 MG/DL
EOSINOPHIL # BLD AUTO: 0.18 K/UL
EOSINOPHIL NFR BLD AUTO: 3.1 %
ERYTHROCYTE [SEDIMENTATION RATE] IN BLOOD BY WESTERGREN METHOD: 7 MM/HR
GLUCOSE SERPL-MCNC: 138 MG/DL
HCT VFR BLD CALC: 46.7 %
HGB BLD-MCNC: 15.2 G/DL
IMM GRANULOCYTES NFR BLD AUTO: 0.7 %
LYMPHOCYTES # BLD AUTO: 1.99 K/UL
LYMPHOCYTES NFR BLD AUTO: 34.8 %
MAN DIFF?: NORMAL
MCHC RBC-ENTMCNC: 29.9 PG
MCHC RBC-ENTMCNC: 32.5 GM/DL
MCV RBC AUTO: 91.9 FL
MONOCYTES # BLD AUTO: 0.57 K/UL
MONOCYTES NFR BLD AUTO: 10 %
NEUTROPHILS # BLD AUTO: 2.91 K/UL
NEUTROPHILS NFR BLD AUTO: 50.9 %
PLATELET # BLD AUTO: 185 K/UL
POTASSIUM SERPL-SCNC: 4.1 MMOL/L
PROT SERPL-MCNC: 7.5 G/DL
RBC # BLD: 5.08 M/UL
RBC # FLD: 14.4 %
SODIUM SERPL-SCNC: 145 MMOL/L
TOTAL IGE SMQN RAST: 2359 KU/L
WBC # FLD AUTO: 5.72 K/UL

## 2021-10-24 RX ORDER — MONTELUKAST 10 MG/1
10 TABLET, FILM COATED ORAL
Qty: 90 | Refills: 3 | Status: ACTIVE | COMMUNITY
Start: 1900-01-01 | End: 1900-01-01

## 2021-10-29 ENCOUNTER — APPOINTMENT (OUTPATIENT)
Dept: PULMONOLOGY | Facility: CLINIC | Age: 61
End: 2021-10-29
Payer: COMMERCIAL

## 2021-10-29 ENCOUNTER — LABORATORY RESULT (OUTPATIENT)
Age: 61
End: 2021-10-29

## 2021-10-29 VITALS
TEMPERATURE: 98.4 F | DIASTOLIC BLOOD PRESSURE: 82 MMHG | OXYGEN SATURATION: 95 % | HEART RATE: 59 BPM | SYSTOLIC BLOOD PRESSURE: 142 MMHG

## 2021-10-29 PROCEDURE — 36415 COLL VENOUS BLD VENIPUNCTURE: CPT

## 2021-10-29 PROCEDURE — 99214 OFFICE O/P EST MOD 30 MIN: CPT | Mod: 25

## 2021-10-29 RX ORDER — AZITHROMYCIN 250 MG/1
250 TABLET, FILM COATED ORAL
Qty: 1 | Refills: 0 | Status: COMPLETED | COMMUNITY
Start: 2021-06-02 | End: 2021-10-29

## 2021-10-29 RX ORDER — METHYLPREDNISOLONE 4 MG/1
4 TABLET ORAL
Qty: 1 | Refills: 0 | Status: COMPLETED | COMMUNITY
Start: 2020-01-27 | End: 2021-10-29

## 2021-10-29 RX ORDER — AZITHROMYCIN 250 MG/1
250 TABLET, FILM COATED ORAL
Qty: 6 | Refills: 0 | Status: COMPLETED | COMMUNITY
Start: 2020-01-27 | End: 2021-10-29

## 2021-10-29 RX ORDER — TADALAFIL 10 MG/1
10 TABLET, FILM COATED ORAL
Refills: 0 | Status: ACTIVE | COMMUNITY

## 2021-10-29 RX ORDER — SILODOSIN 8 MG/1
8 CAPSULE ORAL
Refills: 0 | Status: ACTIVE | COMMUNITY

## 2021-10-29 RX ORDER — EPINEPHRINE 0.3 MG/.3ML
0.3 INJECTION INTRAMUSCULAR
Refills: 0 | Status: ACTIVE | COMMUNITY

## 2021-10-29 RX ORDER — FINASTERIDE 5 MG/1
5 TABLET, FILM COATED ORAL
Refills: 0 | Status: ACTIVE | COMMUNITY

## 2021-10-29 RX ORDER — ICOSAPENT ETHYL 1000 MG/1
1 CAPSULE ORAL
Refills: 0 | Status: COMPLETED | COMMUNITY
End: 2021-10-29

## 2021-10-29 RX ORDER — METHYLPREDNISOLONE 4 MG/1
4 TABLET ORAL
Qty: 1 | Refills: 0 | Status: COMPLETED | COMMUNITY
Start: 2021-06-02 | End: 2021-10-29

## 2021-10-29 RX ORDER — TESTOSTERONE ENANTHATE 100 MG/.5ML
100 INJECTION SUBCUTANEOUS
Refills: 0 | Status: ACTIVE | COMMUNITY

## 2021-10-29 RX ORDER — ROSUVASTATIN CALCIUM 20 MG/1
20 TABLET, FILM COATED ORAL
Refills: 0 | Status: ACTIVE | COMMUNITY

## 2021-10-29 RX ORDER — ALBUTEROL SULFATE 90 UG/1
INHALANT RESPIRATORY (INHALATION)
Refills: 0 | Status: ACTIVE | COMMUNITY

## 2021-10-30 LAB — ERYTHROCYTE [SEDIMENTATION RATE] IN BLOOD BY WESTERGREN METHOD: 10 MM/HR

## 2021-10-30 NOTE — DISCUSSION/SUMMARY
[FreeTextEntry1] : Shortness of breath, possibly secondary to asthmatic bronchitis, rule out seasonal/environmental allergies.  History of CATALINA, on nocturnal APAP.

## 2021-10-30 NOTE — HISTORY OF PRESENT ILLNESS
[TextBox_4] : Patient here for cough, SOB and wheezing,\par  \par Has been using Dulera, started last week and ha not seen a difference; still experiencing SOB \par \par Registered for recall machine and was told to not use it

## 2021-10-30 NOTE — ASSESSMENT
[FreeTextEntry1] : Venipuncture with labs drawn in office\par Continue Dulera HFA.\par Start prednisone taper.\par Discussed with patient regarding the [employer] recall, and advised the patient to contact [employer] for replacement.  Also advised patient to continue to use APAP machine till it gets replaced.\par Patient is compliant and benefiting from APAP usage.\par

## 2021-11-07 LAB
A ALTERNATA IGE QN: <0.1 KUA/L
A ALTERNATA IGE QN: <0.1 KUA/L
A FUMIGATUS IGE QN: <0.1 KUA/L
A FUMIGATUS IGE QN: <0.1 KUA/L
BERMUDA GRASS IGE QN: 1.8 KUA/L
BOXELDER IGE QN: 0.55 KUA/L
C ALBICANS IGE QN: 0.39 KUA/L
C HERBARUM IGE QN: 0.11 KUA/L
C HERBARUM IGE QN: 0.11 KUA/L
CALIF WALNUT IGE QN: 0.96 KUA/L
CAT DANDER IGE QN: 6.35 KUA/L
CAT DANDER IGE QN: 6.35 KUA/L
CLAM IGE QN: 6.44 KUA/L
CMN PIGWEED IGE QN: 0.29 KUA/L
CODFISH IGE QN: 0.2 KUA/L
COMMON RAGWEED IGE QN: 4.71 KUA/L
COMMON RAGWEED IGE QN: 4.71 KUA/L
CORN IGE QN: 0.16 KUA/L
COTTONWOOD IGE QN: 0.27 KUA/L
COW MILK IGE QN: <0.1 KUA/L
D FARINAE IGE QN: 71.9 KUA/L
D FARINAE IGE QN: 71.9 KUA/L
D PTERONYSS IGE QN: 45.3 KUA/L
D PTERONYSS IGE QN: 45.3 KUA/L
DEPRECATED A ALTERNATA IGE RAST QL: 0
DEPRECATED A ALTERNATA IGE RAST QL: 0
DEPRECATED A FUMIGATUS IGE RAST QL: 0
DEPRECATED A FUMIGATUS IGE RAST QL: 0
DEPRECATED BERMUDA GRASS IGE RAST QL: 2
DEPRECATED BOXELDER IGE RAST QL: 1
DEPRECATED C ALBICANS IGE RAST QL: 1
DEPRECATED C HERBARUM IGE RAST QL: NORMAL
DEPRECATED C HERBARUM IGE RAST QL: NORMAL
DEPRECATED CAT DANDER IGE RAST QL: 3
DEPRECATED CAT DANDER IGE RAST QL: 3
DEPRECATED CLAM IGE RAST QL: 3
DEPRECATED CODFISH IGE RAST QL: NORMAL
DEPRECATED COMMON PIGWEED IGE RAST QL: NORMAL
DEPRECATED COMMON RAGWEED IGE RAST QL: 3
DEPRECATED COMMON RAGWEED IGE RAST QL: 3
DEPRECATED CORN IGE RAST QL: NORMAL
DEPRECATED COTTONWOOD IGE RAST QL: NORMAL
DEPRECATED COW MILK IGE RAST QL: 0
DEPRECATED D FARINAE IGE RAST QL: 5
DEPRECATED D FARINAE IGE RAST QL: 5
DEPRECATED D PTERONYSS IGE RAST QL: 4
DEPRECATED D PTERONYSS IGE RAST QL: 4
DEPRECATED DOG DANDER IGE RAST QL: 2
DEPRECATED DOG DANDER IGE RAST QL: 2
DEPRECATED EGG WHITE IGE RAST QL: 0
DEPRECATED GOOSEFOOT IGE RAST QL: 2
DEPRECATED LONDON PLANE IGE RAST QL: 1
DEPRECATED M RACEMOSUS IGE RAST QL: NORMAL
DEPRECATED MOUSE URINE PROT IGE RAST QL: NORMAL
DEPRECATED MUGWORT IGE RAST QL: 2
DEPRECATED P NOTATUM IGE RAST QL: 0
DEPRECATED PEANUT IGE RAST QL: 0
DEPRECATED RED CEDAR IGE RAST QL: 3
DEPRECATED ROACH IGE RAST QL: 4
DEPRECATED ROACH IGE RAST QL: 4
DEPRECATED SCALLOP IGE RAST QL: 6.68 KUA/L
DEPRECATED SESAME SEED IGE RAST QL: NORMAL
DEPRECATED SHEEP SORREL IGE RAST QL: 2
DEPRECATED SHRIMP IGE RAST QL: 5
DEPRECATED SILVER BIRCH IGE RAST QL: NORMAL
DEPRECATED SOYBEAN IGE RAST QL: NORMAL
DEPRECATED TIMOTHY IGE RAST QL: 1
DEPRECATED TIMOTHY IGE RAST QL: 1
DEPRECATED WALNUT IGE RAST QL: 0
DEPRECATED WHEAT IGE RAST QL: 0
DEPRECATED WHITE ASH IGE RAST QL: 2
DEPRECATED WHITE OAK IGE RAST QL: NORMAL
DEPRECATED WHITE OAK IGE RAST QL: NORMAL
DOG DANDER IGE QN: 0.9 KUA/L
DOG DANDER IGE QN: 0.9 KUA/L
EGG WHITE IGE QN: <0.1 KUA/L
GOOSEFOOT IGE QN: 2.23 KUA/L
LONDON PLANE IGE QN: 0.46 KUA/L
M RACEMOSUS IGE QN: 0.13 KUA/L
MOUSE URINE PROT IGE QN: 0.11 KUA/L
MUGWORT IGE QN: 1.53 KUA/L
MULBERRY (T70) CLASS: 2
MULBERRY (T70) CONC: 1.31 KUA/L
P NOTATUM IGE QN: <0.1 KUA/L
PEANUT IGE QN: <0.1 KUA/L
RED CEDAR IGE QN: 5.01 KUA/L
ROACH IGE QN: 30.8 KUA/L
ROACH IGE QN: 30.8 KUA/L
SCALLOP IGE QN: 3
SCALLOP IGE QN: 67.7 KUA/L
SESAME SEED IGE QN: 0.14 KUA/L
SHEEP SORREL IGE QN: 1.28 KUA/L
SILVER BIRCH IGE QN: 0.16 KUA/L
SOYBEAN IGE QN: 0.14 KUA/L
TIMOTHY IGE QN: 0.44 KUA/L
TIMOTHY IGE QN: 0.44 KUA/L
TOTAL IGE SMQN RAST: 2886 KU/L
TREE ALLERG MIX1 IGE QL: 2
WALNUT IGE QN: <0.1 KUA/L
WHEAT IGE QN: <0.1 KUA/L
WHITE ASH IGE QN: 2.68 KUA/L
WHITE ELM IGE QN: 0.56 KUA/L
WHITE ELM IGE QN: 1
WHITE OAK IGE QN: 0.23 KUA/L
WHITE OAK IGE QN: 0.23 KUA/L

## 2021-11-15 ENCOUNTER — RX RENEWAL (OUTPATIENT)
Age: 61
End: 2021-11-15

## 2021-11-15 ENCOUNTER — APPOINTMENT (OUTPATIENT)
Dept: PULMONOLOGY | Facility: CLINIC | Age: 61
End: 2021-11-15
Payer: COMMERCIAL

## 2021-11-15 VITALS
OXYGEN SATURATION: 97 % | BODY MASS INDEX: 41.08 KG/M2 | HEIGHT: 73 IN | TEMPERATURE: 98 F | SYSTOLIC BLOOD PRESSURE: 150 MMHG | DIASTOLIC BLOOD PRESSURE: 74 MMHG | WEIGHT: 310 LBS | HEART RATE: 65 BPM

## 2021-11-15 PROCEDURE — 99214 OFFICE O/P EST MOD 30 MIN: CPT

## 2021-11-15 NOTE — DISCUSSION/SUMMARY
[FreeTextEntry1] : Shortness of breath, possibly secondary to asthmatic bronchitis, and seasonal/environmental allergies, rule out CAD.  History of CATALINA, on nocturnal APAP.

## 2021-11-15 NOTE — REASON FOR VISIT
[Follow-Up] : a follow-up visit [Cough] : cough [Asthma] : asthma [Shortness of Breath] : shortness of breath [TextBox_44] : Still has shortness of breath

## 2021-11-15 NOTE — ASSESSMENT
[FreeTextEntry1] : Continue Dulera HFA.\par Start prednisone 40 mg p.o. daily for 5 days, then taper\par Patient is compliant and benefiting from APAP usage.\par Cardiology follow-up with Dr. Matthews\par \par \par Follow-up in 1 week

## 2021-11-22 ENCOUNTER — APPOINTMENT (OUTPATIENT)
Dept: PULMONOLOGY | Facility: CLINIC | Age: 61
End: 2021-11-22
Payer: COMMERCIAL

## 2021-11-22 VITALS
HEART RATE: 62 BPM | SYSTOLIC BLOOD PRESSURE: 136 MMHG | DIASTOLIC BLOOD PRESSURE: 69 MMHG | RESPIRATION RATE: 16 BRPM | TEMPERATURE: 98.3 F | OXYGEN SATURATION: 95 %

## 2021-11-22 PROCEDURE — 99214 OFFICE O/P EST MOD 30 MIN: CPT | Mod: 25

## 2021-11-22 PROCEDURE — 36415 COLL VENOUS BLD VENIPUNCTURE: CPT

## 2021-11-22 NOTE — ASSESSMENT
[FreeTextEntry1] : Continue Dulera HFA.\par Finish prednisone taper taper\par Patient is compliant and benefiting from APAP usage.\par Cardiology follow-up with Dr. Matthews\par Venipuncture with labs drawn in office\par May need cardiopulmonary exercise test to elucidate the etiology of his dyspnea, should cardiology work-up be unremarkable.

## 2021-11-22 NOTE — REASON FOR VISIT
[Follow-Up] : a follow-up visit [Asthma] : asthma [Cough] : cough [Shortness of Breath] : shortness of breath [TextBox_44] : Still has shortness of breath

## 2021-11-22 NOTE — DISCUSSION/SUMMARY
[FreeTextEntry1] : Shortness of breath, possibly secondary to asthmatic bronchitis, and seasonal/environmental allergies, rule out CAD, rule out deconditioning.  History of CATALINA, on nocturnal APAP.

## 2021-11-24 LAB
ANION GAP SERPL CALC-SCNC: 19 MMOL/L
BASOPHILS # BLD AUTO: 0.04 K/UL
BASOPHILS NFR BLD AUTO: 0.5 %
BUN SERPL-MCNC: 23 MG/DL
CALCIUM SERPL-MCNC: 9.4 MG/DL
CHLORIDE SERPL-SCNC: 100 MMOL/L
CO2 SERPL-SCNC: 21 MMOL/L
CREAT SERPL-MCNC: 0.89 MG/DL
DEPRECATED D DIMER PPP IA-ACNC: <150 NG/ML DDU
EOSINOPHIL # BLD AUTO: 0.09 K/UL
EOSINOPHIL NFR BLD AUTO: 1.2 %
GLUCOSE SERPL-MCNC: 118 MG/DL
HCT VFR BLD CALC: 49.9 %
HGB BLD-MCNC: 16.3 G/DL
IMM GRANULOCYTES NFR BLD AUTO: 1.6 %
LYMPHOCYTES # BLD AUTO: 1.5 K/UL
LYMPHOCYTES NFR BLD AUTO: 19.5 %
MAN DIFF?: NORMAL
MCHC RBC-ENTMCNC: 30.7 PG
MCHC RBC-ENTMCNC: 32.7 GM/DL
MCV RBC AUTO: 94 FL
MONOCYTES # BLD AUTO: 0.61 K/UL
MONOCYTES NFR BLD AUTO: 7.9 %
NEUTROPHILS # BLD AUTO: 5.35 K/UL
NEUTROPHILS NFR BLD AUTO: 69.3 %
PLATELET # BLD AUTO: 218 K/UL
POTASSIUM SERPL-SCNC: 4.5 MMOL/L
RBC # BLD: 5.31 M/UL
RBC # FLD: 14.4 %
SODIUM SERPL-SCNC: 140 MMOL/L
WBC # FLD AUTO: 7.71 K/UL

## 2021-12-20 ENCOUNTER — APPOINTMENT (OUTPATIENT)
Dept: PULMONOLOGY | Facility: CLINIC | Age: 61
End: 2021-12-20
Payer: COMMERCIAL

## 2021-12-20 VITALS
RESPIRATION RATE: 16 BRPM | DIASTOLIC BLOOD PRESSURE: 77 MMHG | OXYGEN SATURATION: 96 % | HEART RATE: 95 BPM | SYSTOLIC BLOOD PRESSURE: 144 MMHG | TEMPERATURE: 97.9 F

## 2021-12-20 PROCEDURE — 99214 OFFICE O/P EST MOD 30 MIN: CPT

## 2021-12-21 NOTE — ASSESSMENT
[FreeTextEntry1] : Continue Dulera HFA.\par Patient is compliant and benefiting from APAP usage.\par Cardiology follow-up with Dr. Matthews\par \par

## 2021-12-21 NOTE — REASON FOR VISIT
[Follow-Up] : a follow-up visit [Asthma] : asthma [Cough] : cough [Shortness of Breath] : shortness of breath [TextBox_44] : Shortness of breath is better

## 2022-02-04 ENCOUNTER — APPOINTMENT (OUTPATIENT)
Dept: PULMONOLOGY | Facility: CLINIC | Age: 62
End: 2022-02-04
Payer: COMMERCIAL

## 2022-02-04 VITALS
HEART RATE: 49 BPM | SYSTOLIC BLOOD PRESSURE: 156 MMHG | DIASTOLIC BLOOD PRESSURE: 91 MMHG | OXYGEN SATURATION: 98 % | TEMPERATURE: 98.2 F

## 2022-02-04 DIAGNOSIS — I11.9 HYPERTENSIVE HEART DISEASE W/OUT HEART FAILURE: ICD-10-CM

## 2022-02-04 DIAGNOSIS — Z01.811 ENCOUNTER FOR PREPROCEDURAL RESPIRATORY EXAMINATION: ICD-10-CM

## 2022-02-04 PROCEDURE — 99212 OFFICE O/P EST SF 10 MIN: CPT

## 2022-02-04 RX ORDER — FUROSEMIDE 20 MG/1
20 TABLET ORAL
Qty: 90 | Refills: 3 | Status: COMPLETED | COMMUNITY
Start: 2020-09-29 | End: 2022-02-04

## 2022-02-04 RX ORDER — PREDNISONE 20 MG/1
20 TABLET ORAL
Qty: 10 | Refills: 0 | Status: COMPLETED | COMMUNITY
Start: 2021-11-15 | End: 2022-02-04

## 2022-02-04 RX ORDER — PREDNISONE 10 MG/1
10 TABLET ORAL
Qty: 12 | Refills: 0 | Status: COMPLETED | COMMUNITY
Start: 2021-10-29 | End: 2022-02-04

## 2022-02-05 PROBLEM — Z01.811 ENCOUNTER FOR PREOPERATIVE PULMONARY EXAMINATION: Status: ACTIVE | Noted: 2022-02-05

## 2022-02-05 PROBLEM — I11.9 BENIGN HYPERTENSIVE HEART DISEASE WITHOUT CHF: Status: ACTIVE | Noted: 2018-07-12

## 2022-02-05 NOTE — HISTORY OF PRESENT ILLNESS
[FreeTextEntry1] : Pulmonary clearance for right shoulder rotator cuff surgery by Dr. Saúl Mitchell on February 10, 2022.\par \par Currently using Dreamstation, applied for repair and replace program

## 2022-02-05 NOTE — ASSESSMENT
[FreeTextEntry1] : Brent is a patient with history of obstructive sleep apnea, on nocturnal APAP.  Secondly, he is a patient undergoing right rotator cuff surgery in the near future.  Thirdly, he is a patient with history of hypertension.\par \par Patient is compliant and benefiting from APAP usage.\par Medications reviewed. Continue present medications.\par There are no acute pulmonary/sleep medicine contraindications to the proposed rotator cuff surgery.  Please make anesthesiology aware of the fact that this patient has obstructive sleep apnea, and take all necessary precautions in both perioperative and postoperative periods

## 2022-02-05 NOTE — CONSULT LETTER
[Dear  ___] : Dear  [unfilled], [Courtesy Letter:] : I had the pleasure of seeing your patient, [unfilled], in my office today. [Please see my note below.] : Please see my note below. [Consult Closing:] : Thank you very much for allowing me to participate in the care of this patient.  If you have any questions, please do not hesitate to contact me. [Sincerely,] : Sincerely, [FreeTextEntry3] : Dr. Tena Savage

## 2022-02-05 NOTE — REASON FOR VISIT
[Follow-Up] : a follow-up visit [Hypersomnolence] : hypersomnolence  [Sleep Apnea] : sleep apnea [FreeTextEntry1] : Came in for pulmonary clearance clearance

## 2022-02-08 DIAGNOSIS — Z01.812 ENCOUNTER FOR PREPROCEDURAL LABORATORY EXAMINATION: ICD-10-CM

## 2022-02-15 ENCOUNTER — APPOINTMENT (OUTPATIENT)
Dept: PULMONOLOGY | Facility: CLINIC | Age: 62
End: 2022-02-15
Payer: COMMERCIAL

## 2022-02-15 VITALS
HEART RATE: 75 BPM | BODY MASS INDEX: 39.98 KG/M2 | RESPIRATION RATE: 15 BRPM | OXYGEN SATURATION: 94 % | DIASTOLIC BLOOD PRESSURE: 81 MMHG | TEMPERATURE: 97.2 F | WEIGHT: 303 LBS | SYSTOLIC BLOOD PRESSURE: 146 MMHG

## 2022-02-15 PROCEDURE — 99214 OFFICE O/P EST MOD 30 MIN: CPT | Mod: 25

## 2022-02-15 PROCEDURE — 94729 DIFFUSING CAPACITY: CPT

## 2022-02-15 PROCEDURE — 94727 GAS DIL/WSHOT DETER LNG VOL: CPT

## 2022-02-15 PROCEDURE — ZZZZZ: CPT

## 2022-02-15 PROCEDURE — 94010 BREATHING CAPACITY TEST: CPT

## 2022-02-15 NOTE — REASON FOR VISIT
[Hypersomnolence] : hypersomnolence  [Sleep Apnea] : sleep apnea [Follow-Up] : a follow-up visit [Asthma] : asthma [Shortness of Breath] : shortness of breath [TextBox_44] : Shortness of breath is significantly better at this point.

## 2022-02-15 NOTE — PHYSICAL EXAM
[General Appearance - Well Developed] : well developed [Normal Appearance] : normal appearance [Well Groomed] : well groomed [General Appearance - Well Nourished] : well nourished [No Deformities] : no deformities [General Appearance - In No Acute Distress] : no acute distress [Heart Rate And Rhythm] : heart rate was normal and rhythm regular [Heart Sounds] : normal S1 and S2 [Heart Sounds Gallop] : no gallops [Murmurs] : no murmurs [Heart Sounds Pericardial Friction Rub] : no pericardial rub [Auscultation Breath Sounds / Voice Sounds] : lungs were clear to auscultation bilaterally [Abdomen Soft] : soft [Bowel Sounds] : normal bowel sounds [Abdomen Tenderness] : non-tender [Abdomen Mass (___ Cm)] : no abdominal mass palpated [Abnormal Walk] : normal gait [Musculoskeletal - Swelling] : no joint swelling seen [Motor Tone] : muscle strength and tone were normal [Nail Clubbing] : no clubbing of the fingernails [Cyanosis, Localized] : no localized cyanosis [Petechial Hemorrhages (___cm)] : no petechial hemorrhages [] : no ischemic changes

## 2022-02-15 NOTE — ASSESSMENT
[FreeTextEntry1] : Brent is a patient with history of obstructive sleep apnea, on nocturnal APAP. Secondly, improved cough secondary to asthmatic bronchitis. Thirdly, he is a patient with history of hypertension.\par \par Auto-titrating Positive Airway Pressure(APAP) compliance reviewed with patient in office today\par Patient is compliant and benefiting from APAP usage.\par Medications reviewed. Continue present medications.\par Hold Dulera at this point.\par Return for pulmonary follow-up in 2 months.

## 2022-02-15 NOTE — PROCEDURE
[FreeTextEntry1] : Pulmonary function test performed in my office today: Spirometry is within normal limits; lung volumes within normal; diffusion is within normal limits.\par \par Auto-titrating Positive Airway Pressure(APAP) compliance reviewed with patient in office today\par

## 2022-02-15 NOTE — DISCUSSION/SUMMARY
[FreeTextEntry1] : Significantly improved cough secondary to asthmatic bronchitis. Obstructive sleep apnea on nocturnal APAP.

## 2022-03-07 ENCOUNTER — APPOINTMENT (OUTPATIENT)
Dept: PULMONOLOGY | Facility: CLINIC | Age: 62
End: 2022-03-07
Payer: COMMERCIAL

## 2022-03-07 ENCOUNTER — APPOINTMENT (OUTPATIENT)
Dept: PULMONOLOGY | Facility: CLINIC | Age: 62
End: 2022-03-07

## 2022-03-07 VITALS
SYSTOLIC BLOOD PRESSURE: 132 MMHG | TEMPERATURE: 97.2 F | OXYGEN SATURATION: 96 % | HEART RATE: 84 BPM | DIASTOLIC BLOOD PRESSURE: 68 MMHG

## 2022-03-07 PROCEDURE — 99214 OFFICE O/P EST MOD 30 MIN: CPT

## 2022-03-07 RX ORDER — AMOXICILLIN 500 MG/1
500 TABLET, FILM COATED ORAL
Qty: 21 | Refills: 0 | Status: COMPLETED | COMMUNITY
Start: 2021-12-13

## 2022-03-07 RX ORDER — CHLORHEXIDINE GLUCONATE, 0.12% ORAL RINSE 1.2 MG/ML
0.12 SOLUTION DENTAL
Qty: 473 | Refills: 0 | Status: COMPLETED | COMMUNITY
Start: 2021-12-13

## 2022-03-07 RX ORDER — DIAZEPAM 5 MG/1
5 TABLET ORAL
Qty: 2 | Refills: 0 | Status: COMPLETED | COMMUNITY
Start: 2022-01-24

## 2022-03-07 RX ORDER — ACETAMINOPHEN AND CODEINE 300; 30 MG/1; MG/1
300-30 TABLET ORAL
Qty: 12 | Refills: 0 | Status: COMPLETED | COMMUNITY
Start: 2021-12-13

## 2022-03-07 RX ORDER — OXYCODONE AND ACETAMINOPHEN 10; 325 MG/1; MG/1
10-325 TABLET ORAL
Qty: 20 | Refills: 0 | Status: COMPLETED | COMMUNITY
Start: 2022-02-10

## 2022-03-07 NOTE — REASON FOR VISIT
[Follow-Up] : a follow-up visit [Sleep Apnea] : sleep apnea [Shortness of Breath] : shortness of breath [Asthma] : asthma [TextBox_44] : Has chest tightness

## 2022-03-07 NOTE — HISTORY OF PRESENT ILLNESS
[TextBox_4] : CATALINA and SOB f/u \par \par Currently using Dreamstation\par \par Feels "restriction" in the upper airways, not in the chest; started also having dry cough again 4-5 days ago; denies wheezing or chest pain

## 2022-03-07 NOTE — ASSESSMENT
[FreeTextEntry1] : Resume Dulera HFA.\par Albuterol HFA as needed for shortness of breath.\par Patient is compliant and benefiting from APAP usage.\par Return for pulmonary follow-up in 1 week.\par \par

## 2022-03-15 ENCOUNTER — APPOINTMENT (OUTPATIENT)
Dept: PULMONOLOGY | Facility: CLINIC | Age: 62
End: 2022-03-15
Payer: COMMERCIAL

## 2022-03-15 VITALS
DIASTOLIC BLOOD PRESSURE: 78 MMHG | SYSTOLIC BLOOD PRESSURE: 145 MMHG | HEART RATE: 69 BPM | TEMPERATURE: 98.1 F | OXYGEN SATURATION: 96 %

## 2022-03-15 PROCEDURE — 99214 OFFICE O/P EST MOD 30 MIN: CPT

## 2022-03-15 NOTE — ASSESSMENT
[FreeTextEntry1] : Continue Dulera HFA\par Albuterol HFA as needed for shortness of breath\par Auto-titrating Positive Airway Pressure(APAP) compliance reviewed with patient in office today\par Patient is compliant and benefiting from APAP usage.\par Return for follow-up in 2 months.\par

## 2022-03-15 NOTE — REASON FOR VISIT
[Follow-Up] : a follow-up visit [Asthma] : asthma [Sleep Apnea] : sleep apnea [Shortness of Breath] : shortness of breath [TextBox_44] : Shortness of breath is significantly improved after Dulera was started.

## 2022-03-15 NOTE — PROCEDURE
[FreeTextEntry1] : Patient is overall doing well \par \par Compliance Summary\par 3/8/2022 - 3/14/2022 (7 days)\par Days with Device Usage 7 days\par Days without Device Usage 0 days\par Percent Days with Device Usage 100.0%\par Cumulative Usage 1 day 21 hrs. 1 mins. 4 secs.\par Maximum Usage (1 Day) 7 hrs. 38 mins. 30 secs.\par Average Usage (All Days) 6 hrs. 25 mins. 52 secs.\par Average Usage (Days Used) 6 hrs. 25 mins. 52 secs.\par Minimum Usage (1 Day) 5 hrs. 20 mins. 31 secs.\par Percent of Days with Usage >= 4 Hours 100.0%\par Percent of Days with Usage < 4 Hours 0.0%\par Date Range\par Total Blower Time 1 day 21 hrs. 1 mins. 5 secs.\par Average AHI 3.1\par Auto-CPAP Summary\par Auto-CPAP Mean Pressure 11.0 cmH2O\par Auto-CPAP Peak Average Pressure 13.1 cmH2O\par Device Pressure <= 90% of Time 13.1 cmH2O\par Average Time in Large Leak Per Day 0 secs.\par Printed By: Care  Page 1 of 2\par This report is only one of several elements to consider when evaluating therapy effectiveness and is not a substitute for diagnostic data.\par [email or web address] 3/15/2022\par Patient ID: [id number] HUDARRION OROSCO\par Device Settings as of 3/14/2022\par AutoCPAP - A-Flex\par Device Settings\par Device Mode\par Parameter Value\par Min Pressure 5 cmH2O\par Max Pressure 20 cmH2O\par A-Flex Setting 2\par Auto Off Off\par Auto On Off\par View Optional Screens Off\par Ramp Type Linear\par Ramp Time 20 minutes\par Ramp Start Pressure 5.0 cmH2O\par Mask Resistance 2\par Mask Resistance Lock Off\par Tubing Type 15 HT\par Tubing Type Lock Off\par Opti-Start Off\par EZ-Start Disabled\par Tube Temperature Off\par Humidifier 5\par Humidification Mode on Heated Tube Disconnect Adaptive

## 2022-03-15 NOTE — HISTORY OF PRESENT ILLNESS
[TextBox_4] : CATALINA and SOB f/u \par \par Overall feeling well; denies cough, chest tightness, SOB\par \par Has been using Dulera and is feeling much better; has not had to use Albuterol

## 2022-05-24 ENCOUNTER — APPOINTMENT (OUTPATIENT)
Dept: PULMONOLOGY | Facility: CLINIC | Age: 62
End: 2022-05-24

## 2022-06-17 NOTE — PHYSICAL EXAM
[No Acute Distress] : no acute distress [Normal Oropharynx] : normal oropharynx [Normal Appearance] : normal appearance [No Neck Mass] : no neck mass [Normal Rate/Rhythm] : normal rate/rhythm [Normal S1, S2] : normal s1, s2 [No Murmurs] : no murmurs [No Resp Distress] : no resp distress [Clear to Auscultation Bilaterally] : clear to auscultation bilaterally [No Abnormalities] : no abnormalities [Benign] : benign [Normal Gait] : normal gait [No Clubbing] : no clubbing [No Cyanosis] : no cyanosis [No Edema] : no edema [FROM] : FROM [Normal Color/ Pigmentation] : normal color/ pigmentation [No Focal Deficits] : no focal deficits [Oriented x3] : oriented x3 [Normal Affect] : normal affect No assistance needed

## 2022-07-18 ENCOUNTER — APPOINTMENT (OUTPATIENT)
Dept: PULMONOLOGY | Facility: CLINIC | Age: 62
End: 2022-07-18

## 2022-07-18 VITALS
HEIGHT: 73 IN | SYSTOLIC BLOOD PRESSURE: 130 MMHG | WEIGHT: 300 LBS | HEART RATE: 63 BPM | DIASTOLIC BLOOD PRESSURE: 67 MMHG | RESPIRATION RATE: 14 BRPM | BODY MASS INDEX: 39.76 KG/M2 | OXYGEN SATURATION: 96 % | TEMPERATURE: 97.7 F

## 2022-07-18 PROCEDURE — 94727 GAS DIL/WSHOT DETER LNG VOL: CPT

## 2022-07-18 PROCEDURE — 94729 DIFFUSING CAPACITY: CPT

## 2022-07-18 PROCEDURE — ZZZZZ: CPT

## 2022-07-18 PROCEDURE — 94010 BREATHING CAPACITY TEST: CPT

## 2022-07-18 PROCEDURE — 99214 OFFICE O/P EST MOD 30 MIN: CPT | Mod: 25

## 2022-07-18 PROCEDURE — 88738 HGB QUANT TRANSCUTANEOUS: CPT

## 2022-07-18 PROCEDURE — 95012 NITRIC OXIDE EXP GAS DETER: CPT

## 2022-07-18 RX ORDER — LORAZEPAM 1 MG/1
1 TABLET ORAL
Qty: 2 | Refills: 0 | Status: COMPLETED | COMMUNITY
Start: 2022-06-07

## 2022-07-18 NOTE — DISCUSSION/SUMMARY
[FreeTextEntry1] : Improved shortness of breath secondary to asthma.  Obstructive sleep apnea on nocturnal APAP.

## 2022-07-18 NOTE — PROCEDURE
[FreeTextEntry1] : Pulmonary Function Test performed in my office today: Spirometry: Within normal limits; Lung Volume: Within normal limits; Diffusion: Within normal limits.\par \par \par  Exhaled Nitric Oxide             Final\par \par No Documents Attached\par \par \par   Test   Result   Flag Reference Goal Last Verified \par   Exhaled Nitric Oxide 16      REQUIRED \par \par  Ordered by: ЮЛИЯ AQUINO       Collected/Examined: [id number] 02:48PM       \par Verification Required       Stage: Final       \par  Performed at: In Office       Performed by: ЮЛИЯ AQUINO       Resulted: [id number] 02:48PM       Last Updated: [id number] 02:49PM       \par

## 2022-07-18 NOTE — HISTORY OF PRESENT ILLNESS
[TextBox_4] : Overall feeling; was seen by cardio and was told he has a arrhythmia; will be using holter monitor \par \par Noticing a sound from APAP mask (using nasal pillow, goes between large and medium)

## 2022-09-20 ENCOUNTER — APPOINTMENT (OUTPATIENT)
Dept: PULMONOLOGY | Facility: CLINIC | Age: 62
End: 2022-09-20

## 2022-09-20 VITALS
SYSTOLIC BLOOD PRESSURE: 110 MMHG | OXYGEN SATURATION: 94 % | HEART RATE: 84 BPM | WEIGHT: 304 LBS | TEMPERATURE: 97.2 F | BODY MASS INDEX: 39.43 KG/M2 | HEIGHT: 73.5 IN | RESPIRATION RATE: 16 BRPM | DIASTOLIC BLOOD PRESSURE: 67 MMHG

## 2022-09-20 DIAGNOSIS — M23.209 DERANGEMENT OF UNSPECIFIED MENISCUS DUE TO OLD TEAR OR INJURY, UNSPECIFIED KNEE: ICD-10-CM

## 2022-09-20 DIAGNOSIS — S53.441A ULNAR COLLATERAL LIGAMENT SPRAIN OF RIGHT ELBOW, INITIAL ENCOUNTER: ICD-10-CM

## 2022-09-20 DIAGNOSIS — Z87.39 PERSONAL HISTORY OF OTHER DISEASES OF THE MUSCULOSKELETAL SYSTEM AND CONNECTIVE TISSUE: ICD-10-CM

## 2022-09-20 LAB — POCT - HEMOGLOBIN (HGB), QUANTITATIVE, TRANSCUTANEOUS: 15.6

## 2022-09-20 PROCEDURE — 94727 GAS DIL/WSHOT DETER LNG VOL: CPT

## 2022-09-20 PROCEDURE — 88738 HGB QUANT TRANSCUTANEOUS: CPT

## 2022-09-20 PROCEDURE — 94010 BREATHING CAPACITY TEST: CPT

## 2022-09-20 PROCEDURE — 94729 DIFFUSING CAPACITY: CPT

## 2022-09-20 PROCEDURE — 99214 OFFICE O/P EST MOD 30 MIN: CPT | Mod: 25

## 2022-09-20 RX ORDER — APIXABAN 5 MG/1
5 TABLET, FILM COATED ORAL
Refills: 0 | Status: ACTIVE | COMMUNITY

## 2022-09-20 NOTE — ASSESSMENT
[FreeTextEntry1] : Increase Dulera HFA to 2 puffs twice daily\par Auto-titrating Positive Airway Pressure(APAP) compliance reviewed with patient in office today\par Patient is compliant and benefiting from APAP usage.\par Return for pulmonary follow-up in 1 month.

## 2022-09-20 NOTE — HISTORY OF PRESENT ILLNESS
[TextBox_4] : Had shortness of breath, requiring resuming Dulera HFA, taking 1 puff twice daily at this point.\par \par Overall feeling well; stopped taking Dulera for a week but then started to take it again, one puff every 12 hrs \par \par Denies SOB, cough, chest tightness

## 2022-10-24 ENCOUNTER — RX RENEWAL (OUTPATIENT)
Age: 62
End: 2022-10-24

## 2022-11-15 ENCOUNTER — APPOINTMENT (OUTPATIENT)
Dept: PULMONOLOGY | Facility: CLINIC | Age: 62
End: 2022-11-15

## 2022-11-15 VITALS — HEART RATE: 55 BPM | OXYGEN SATURATION: 97 % | SYSTOLIC BLOOD PRESSURE: 129 MMHG | DIASTOLIC BLOOD PRESSURE: 72 MMHG

## 2022-11-15 PROCEDURE — ZZZZZ: CPT

## 2022-11-15 PROCEDURE — 94729 DIFFUSING CAPACITY: CPT

## 2022-11-15 PROCEDURE — 99214 OFFICE O/P EST MOD 30 MIN: CPT | Mod: 25

## 2022-11-15 PROCEDURE — 94727 GAS DIL/WSHOT DETER LNG VOL: CPT

## 2022-11-15 PROCEDURE — 94010 BREATHING CAPACITY TEST: CPT

## 2022-11-15 PROCEDURE — 88738 HGB QUANT TRANSCUTANEOUS: CPT

## 2022-11-16 NOTE — DISCUSSION/SUMMARY
[FreeTextEntry1] : Chest tightness and shortness of breath likely secondary to acute asthmatic bronchitis.  Obstructive sleep apnea.

## 2022-11-16 NOTE — PROCEDURE
[FreeTextEntry1] : Pulmonary Function Test performed in my office today: Spirometry: Suggestive of mild restrictive defect; Lung Volume: Within normal limits; Diffusion: Within normal limits.\par

## 2022-11-16 NOTE — ASSESSMENT
[FreeTextEntry1] : Start doxycycline and Medrol Dosepak.\par Continue Dulera HFA to 2 puffs twice daily\par Auto-titrating Positive Airway Pressure(APAP) compliance reviewed with patient in office today\par Patient is compliant and benefiting from APAP usage.\par Return for pulmonary follow-up in 1 month.

## 2022-11-29 NOTE — ED PROVIDER NOTE - PMH
Essential hypertension Calcipotriene Counseling:  I discussed with the patient the risks of calcipotriene including but not limited to erythema, scaling, itching, and irritation.

## 2022-12-14 ENCOUNTER — APPOINTMENT (OUTPATIENT)
Dept: PULMONOLOGY | Facility: CLINIC | Age: 62
End: 2022-12-14

## 2022-12-14 VITALS
TEMPERATURE: 97.8 F | OXYGEN SATURATION: 96 % | DIASTOLIC BLOOD PRESSURE: 74 MMHG | SYSTOLIC BLOOD PRESSURE: 146 MMHG | HEART RATE: 82 BPM

## 2022-12-14 PROCEDURE — 95012 NITRIC OXIDE EXP GAS DETER: CPT

## 2022-12-14 PROCEDURE — 94010 BREATHING CAPACITY TEST: CPT

## 2022-12-14 PROCEDURE — 99214 OFFICE O/P EST MOD 30 MIN: CPT | Mod: 25

## 2022-12-15 NOTE — ADDENDUM
[FreeTextEntry1] : I, Wilfred Godoy, acted solely as a scribe for Dr. Tena Savage D.O. on this date 12/14/2022.\par \par All medical record entries made by the Scribe were at my, Dr. Tena Savage D.O., direction and personally dictated by me on 12/14/2022 . I have reviewed the chart and agree that the record accurately reflects my personal performance of the history, physical exam, assessment and plan. I have also personally directed, reviewed, and agreed with the chart.

## 2022-12-15 NOTE — PROCEDURE
[FreeTextEntry1] :  Spirometry: Within normal limits\par \par  Exhaled Nitric Oxide             Final\par \par No Documents Attached\par \par \par   Test   Result   Flag Reference Goal Last Verified \par   Exhaled Nitric Oxide 32      REQUIRED \par \par  Ordered by: ЮЛИЯ AQUINO       Collected/Examined: 14Dec2022 03:47PM       \par Verification Required       Stage: Final       \par  Performed at: In Office       Performed by: ЮЛИЯ AQUINO       Resulted: [id number] 03:47PM       Last Updated: 14Dec2022 03:47PM       \par

## 2022-12-15 NOTE — HISTORY OF PRESENT ILLNESS
[TextBox_4] : DARRION HU is a 62 year old male who presents for follow-up evaluation of asthma, cough, and shortness of breath. Patient mentions that he has been feeling well. Patient denies shortness of breath, and wheezing. He mentions that he has been using Dulera twice a day

## 2022-12-15 NOTE — REASON FOR VISIT
[Follow-Up] : a follow-up visit [Asthma] : asthma [Sleep Apnea] : sleep apnea [Shortness of Breath] : shortness of breath [Hypersomnolence] : hypersomnolence

## 2022-12-15 NOTE — ASSESSMENT
[FreeTextEntry1] : Continue Dulera HFA to 2 puffs twice daily\par Auto-titrating Positive Airway Pressure(APAP) compliance reviewed with patient in office today\par Patient is compliant and benefiting from APAP usage.\par PFT obtained and reviewed today\par Return for pulmonary follow-up in 2 months.

## 2023-02-15 ENCOUNTER — APPOINTMENT (OUTPATIENT)
Dept: PULMONOLOGY | Facility: CLINIC | Age: 63
End: 2023-02-15
Payer: COMMERCIAL

## 2023-02-15 VITALS
RESPIRATION RATE: 16 BRPM | OXYGEN SATURATION: 97 % | DIASTOLIC BLOOD PRESSURE: 66 MMHG | WEIGHT: 305 LBS | HEIGHT: 73 IN | SYSTOLIC BLOOD PRESSURE: 117 MMHG | HEART RATE: 67 BPM | BODY MASS INDEX: 40.42 KG/M2

## 2023-02-15 PROCEDURE — 88738 HGB QUANT TRANSCUTANEOUS: CPT

## 2023-02-15 PROCEDURE — ZZZZZ: CPT

## 2023-02-15 PROCEDURE — 99214 OFFICE O/P EST MOD 30 MIN: CPT | Mod: 25

## 2023-02-15 PROCEDURE — 94727 GAS DIL/WSHOT DETER LNG VOL: CPT

## 2023-02-15 PROCEDURE — 94010 BREATHING CAPACITY TEST: CPT

## 2023-02-15 PROCEDURE — 94729 DIFFUSING CAPACITY: CPT

## 2023-02-15 RX ORDER — APIXABAN 5 MG/1
5 TABLET, FILM COATED ORAL
Qty: 60 | Refills: 0 | Status: COMPLETED | COMMUNITY
Start: 2022-06-07 | End: 2023-02-15

## 2023-02-15 RX ORDER — METHYLPREDNISOLONE 4 MG/1
4 TABLET ORAL
Qty: 1 | Refills: 0 | Status: COMPLETED | COMMUNITY
Start: 2022-11-15 | End: 2023-02-15

## 2023-02-15 RX ORDER — DOXYCYCLINE HYCLATE 100 MG/1
100 CAPSULE ORAL
Qty: 10 | Refills: 0 | Status: COMPLETED | COMMUNITY
Start: 2022-11-15 | End: 2023-02-15

## 2023-02-16 NOTE — HISTORY OF PRESENT ILLNESS
[TextBox_4] : CATALINA f/u \par Reports no current sleep symptoms. Using PAP on a nightly basis without difficulty. Reports no symptoms of daytime sleepiness. Has not received supplies since having new APAP machine.\par \par Device: Envis 2 \par \par Vendor: [employer] \par \par Settin-20\par \par Mask: nasal pillow mask \par \par Issues: having episodes at night where he feels that "he stops breathing overnight"; has happened 5-6 times in the past month; has also had dry mouth

## 2023-02-16 NOTE — REASON FOR VISIT
[Follow-Up] : a follow-up visit [Asthma] : asthma [Sleep Apnea] : sleep apnea [Hypersomnolence] : hypersomnolence [Shortness of Breath] : shortness of breath

## 2023-02-16 NOTE — PROCEDURE
[FreeTextEntry1] : Pulmonary Function Test performed in my office today: Spirometry: Within normal limits; Lung Volume: Within normal limits; Diffusion: Within normal limits.\par _______\par \par  POCT - Hemoglobin (Hgb), quantitative, transcutaneous             Final\par \par No Documents Attached\par \par \par   Test   Result   Flag Reference Goal Last Verified \par   POCT - Hemoglobin (Hgb), quantitative, transcutaneous 16.2      REQUIRED \par \par  Ordered by: ЮЛИЯ AQUINO       Collected/Examined: [id number] 04:34PM       \par Verification Required       Stage: Final       \par  Performed at: In Office       Performed by: RAUL BRONSON       Resulted: [id number] 04:34PM       Last Updated: [id number] 04:36PM       \par \par

## 2023-02-16 NOTE — ASSESSMENT
[FreeTextEntry1] : Continue Dulera HFA for asthma.\par Albuterol HFA as needed for shortness of breath/asthma\par Auto-titrating Positive Airway Pressure(APAP) compliance reviewed with patient in office today\par Patient is compliant and benefiting from APAP usage.\par Return for pulmonary follow-up in 3 months.\par

## 2023-02-16 NOTE — DISCUSSION/SUMMARY
[FreeTextEntry1] : Patient is currently on treatment with PAP. Data download confirms excellent compliance. Patient continues to use treatment and is benefiting from it.

## 2023-02-20 LAB — POCT - HEMOGLOBIN (HGB), QUANTITATIVE, TRANSCUTANEOUS: 16.2

## 2023-05-17 ENCOUNTER — APPOINTMENT (OUTPATIENT)
Dept: PULMONOLOGY | Facility: CLINIC | Age: 63
End: 2023-05-17
Payer: COMMERCIAL

## 2023-05-17 VITALS — DIASTOLIC BLOOD PRESSURE: 69 MMHG | SYSTOLIC BLOOD PRESSURE: 122 MMHG | HEART RATE: 59 BPM | OXYGEN SATURATION: 95 %

## 2023-05-17 PROCEDURE — 94727 GAS DIL/WSHOT DETER LNG VOL: CPT

## 2023-05-17 PROCEDURE — 94010 BREATHING CAPACITY TEST: CPT

## 2023-05-17 PROCEDURE — 94729 DIFFUSING CAPACITY: CPT

## 2023-05-17 PROCEDURE — ZZZZZ: CPT

## 2023-05-17 PROCEDURE — 95012 NITRIC OXIDE EXP GAS DETER: CPT

## 2023-05-17 PROCEDURE — 99214 OFFICE O/P EST MOD 30 MIN: CPT | Mod: 25

## 2023-05-17 NOTE — PROCEDURE
[FreeTextEntry1] : Pulmonary Function Test performed in my office today: Spirometry: Within normal limits; Lung Volume: Within normal limits with air trapping; Diffusion: Within normal limits.\par \par _______\par \par  Exhaled Nitric Oxide             Final\par \par No Documents Attached\par \par \par   Test   Result   Flag Reference Goal Last Verified \par   Exhaled Nitric Oxide 23      REQUIRED \par \par  Ordered by: ЮЛИЯ AQUINO       Collected/Examined: [id number] 09:32AM       \par Verification Required       Stage: Final       \par  Performed at: In Office       Performed by: ЮЛИЯ AQUINO       Resulted: [id number] 09:32AM       Last Updated: [id number] 09:32AM       \par \par

## 2023-05-17 NOTE — HISTORY OF PRESENT ILLNESS
[TextBox_4] : CATALINA f/u \par Reports no current sleep symptoms. Using PAP on a nightly basis without difficulty. Reports no symptoms of daytime sleepiness. Has not received supplies since having new APAP machine.\par \par Device: FatTail 2 \par \par Vendor: [employer] \par \par Settin-20\par \par Mask: nasal pillow mask \par \par Issues: having episodes at night where he feels that "he stops breathing overnight"; has happened 5-6 times in the past month; has also had dry mouth \par \par Shortness of breath is much better with Dulera HFA.

## 2023-08-16 ENCOUNTER — APPOINTMENT (OUTPATIENT)
Dept: PULMONOLOGY | Facility: CLINIC | Age: 63
End: 2023-08-16
Payer: COMMERCIAL

## 2023-08-16 VITALS — DIASTOLIC BLOOD PRESSURE: 75 MMHG | SYSTOLIC BLOOD PRESSURE: 132 MMHG | OXYGEN SATURATION: 97 % | HEART RATE: 58 BPM

## 2023-08-16 PROCEDURE — 94010 BREATHING CAPACITY TEST: CPT

## 2023-08-16 PROCEDURE — 94727 GAS DIL/WSHOT DETER LNG VOL: CPT

## 2023-08-16 PROCEDURE — ZZZZZ: CPT

## 2023-08-16 PROCEDURE — 94729 DIFFUSING CAPACITY: CPT

## 2023-08-16 PROCEDURE — 88738 HGB QUANT TRANSCUTANEOUS: CPT

## 2023-08-16 PROCEDURE — 95012 NITRIC OXIDE EXP GAS DETER: CPT

## 2023-08-16 PROCEDURE — 99214 OFFICE O/P EST MOD 30 MIN: CPT | Mod: 25

## 2023-08-16 NOTE — PROCEDURE
[FreeTextEntry1] : Pulmonary Function Test obtained in office today which revealed: Spirometry: Within normal limits, Lung Volume: Within normal limits, Diffusion: Within normal limits.  ___ NIOX: 22 ___ Normal Hgb.

## 2023-08-16 NOTE — ADDENDUM
[FreeTextEntry1] : I, Bret León, acted solely as a scribe for Dr. Tena Savage D.O., on this date 08/16/2023.   All medical record entries made by the Scribe were at my, Dr. Tena Savage D.O., direction and personally dictated by me on 08/16/2023. I have reviewed the chart and agree that the record accurately reflects my personal performance of the history, physical exam, assessment and plan. I have also personally directed, reviewed, and agreed with the chart.

## 2023-08-16 NOTE — HISTORY OF PRESENT ILLNESS
[FreeTextEntry1] : DARRION HU is a 63 year old male, with history of obstructive sleep apnea, who presents to the office for follow up evaluation. Reports no current respiratory symptoms or sleep symptoms. Using PAP on a nightly basis without difficulty. Reports no symptoms of daytime sleepiness. Getting supplies regularly. Patient reports of having episodes of dyspnea when he is not able to use his Dulera inhaler.

## 2023-08-16 NOTE — ASSESSMENT
[FreeTextEntry1] : Mr. DARRION HU is an 63 year old male with obstructive sleep apnea.  Stable asthma Auto-titrating Positive Airway Pressure(APAP) compliance reviewed with patient in office today Patient is compliant and benefiting from APAP usage.  Obtained and reviewed pulmonary function test, NIOX results with patient today.  Continue Dulera HFA for history of asthma.   Return for sleep follow up in 3 months.

## 2023-08-17 LAB — POCT - HEMOGLOBIN (HGB), QUANTITATIVE, TRANSCUTANEOUS: 16.2

## 2023-10-13 ENCOUNTER — RX RENEWAL (OUTPATIENT)
Age: 63
End: 2023-10-13

## 2023-10-13 RX ORDER — MOMETASONE FUROATE AND FORMOTEROL FUMARATE DIHYDRATE 200; 5 UG/1; UG/1
200-5 AEROSOL RESPIRATORY (INHALATION)
Qty: 3 | Refills: 3 | Status: ACTIVE | COMMUNITY
Start: 2021-12-06 | End: 1900-01-01

## 2023-10-18 ENCOUNTER — RX RENEWAL (OUTPATIENT)
Age: 63
End: 2023-10-18

## 2023-10-18 RX ORDER — LOSARTAN POTASSIUM AND HYDROCHLOROTHIAZIDE 25; 100 MG/1; MG/1
100-25 TABLET ORAL
Qty: 90 | Refills: 3 | Status: ACTIVE | COMMUNITY
Start: 2019-07-03 | End: 1900-01-01

## 2023-10-18 RX ORDER — HYDRALAZINE HYDROCHLORIDE 50 MG/1
50 TABLET ORAL
Qty: 180 | Refills: 3 | Status: ACTIVE | COMMUNITY
Start: 2019-07-16 | End: 1900-01-01

## 2023-11-15 ENCOUNTER — APPOINTMENT (OUTPATIENT)
Dept: PULMONOLOGY | Facility: CLINIC | Age: 63
End: 2023-11-15
Payer: COMMERCIAL

## 2023-11-15 VITALS — OXYGEN SATURATION: 96 % | HEART RATE: 62 BPM | DIASTOLIC BLOOD PRESSURE: 77 MMHG | SYSTOLIC BLOOD PRESSURE: 143 MMHG

## 2023-11-15 PROCEDURE — 88738 HGB QUANT TRANSCUTANEOUS: CPT

## 2023-11-15 PROCEDURE — 94729 DIFFUSING CAPACITY: CPT

## 2023-11-15 PROCEDURE — 95012 NITRIC OXIDE EXP GAS DETER: CPT

## 2023-11-15 PROCEDURE — 94727 GAS DIL/WSHOT DETER LNG VOL: CPT

## 2023-11-15 PROCEDURE — 99214 OFFICE O/P EST MOD 30 MIN: CPT | Mod: 25

## 2023-11-15 PROCEDURE — 94060 EVALUATION OF WHEEZING: CPT

## 2023-11-15 PROCEDURE — ZZZZZ: CPT

## 2023-11-16 LAB — POCT - HEMOGLOBIN (HGB), QUANTITATIVE, TRANSCUTANEOUS: 16.7

## 2024-02-08 ENCOUNTER — APPOINTMENT (OUTPATIENT)
Dept: PULMONOLOGY | Facility: CLINIC | Age: 64
End: 2024-02-08
Payer: COMMERCIAL

## 2024-02-08 VITALS — HEART RATE: 66 BPM | DIASTOLIC BLOOD PRESSURE: 63 MMHG | SYSTOLIC BLOOD PRESSURE: 139 MMHG | OXYGEN SATURATION: 96 %

## 2024-02-08 DIAGNOSIS — J45.909 UNSPECIFIED ASTHMA, UNCOMPLICATED: ICD-10-CM

## 2024-02-08 PROCEDURE — 94727 GAS DIL/WSHOT DETER LNG VOL: CPT

## 2024-02-08 PROCEDURE — 95012 NITRIC OXIDE EXP GAS DETER: CPT

## 2024-02-08 PROCEDURE — 94729 DIFFUSING CAPACITY: CPT

## 2024-02-08 PROCEDURE — ZZZZZ: CPT

## 2024-02-08 PROCEDURE — 99214 OFFICE O/P EST MOD 30 MIN: CPT | Mod: 25

## 2024-02-08 PROCEDURE — 94010 BREATHING CAPACITY TEST: CPT

## 2024-02-08 NOTE — PROCEDURE
[FreeTextEntry1] : Pulmonary Function Test obtained in office today which revealed: Spirometry: Within normal limits with some improvement post bronchodilator, Lung Volume: Within normal limits, Diffusion: Within normal limits.  __ NIOX: 23

## 2024-02-08 NOTE — PHYSICAL EXAM
[Normal Oropharynx] : normal oropharynx [Heart Rate And Rhythm] : heart rate was normal and rhythm regular [Heart Sounds] : normal S1 and S2 [Heart Sounds Gallop] : no gallops [Murmurs] : no murmurs [Heart Sounds Pericardial Friction Rub] : no pericardial rub [Auscultation Breath Sounds / Voice Sounds] : lungs were clear to auscultation bilaterally [Bowel Sounds] : normal bowel sounds [Abdomen Soft] : soft [Abdomen Tenderness] : non-tender [Abdomen Mass (___ Cm)] : no abdominal mass palpated [Abnormal Walk] : normal gait [Musculoskeletal - Swelling] : no joint swelling seen [Motor Tone] : muscle strength and tone were normal [Nail Clubbing] : no clubbing of the fingernails [Cyanosis, Localized] : no localized cyanosis [Petechial Hemorrhages (___cm)] : no petechial hemorrhages [] : no ischemic changes

## 2024-02-08 NOTE — ASSESSMENT
[FreeTextEntry1] : Auto-titrating Positive Airway Pressure(APAP) compliance reviewed with patient in office today Patient is compliant and benefiting from APAP usage. Obtained and reviewed pulmonary function test NIOX results with patient today.  Continue Dulera 2 puffs twice a day. Return for sleep follow up in 3 months.

## 2024-02-08 NOTE — REASON FOR VISIT
[Shortness of Breath] : shortness of breath [Follow-Up] : a follow-up visit [Asthma] : asthma [Hypersomnolence] : hypersomnolence  [Sleep Apnea] : sleep apnea

## 2024-02-08 NOTE — ADDENDUM
[FreeTextEntry1] : I, Bret León, acted solely as a scribe for Dr. Tena Savage D.O., on this date 11/15/2023.   All medical record entries made by the Scribe were at my, Dr. Tena Savage D.O., direction and personally dictated by me on 11/15/2023. I have reviewed the chart and agree that the record accurately reflects my personal performance of the history, physical exam, assessment and plan. I have also personally directed, reviewed, and agreed with the chart.

## 2024-02-08 NOTE — HISTORY OF PRESENT ILLNESS
[FreeTextEntry1] : DARRION HU is a 63 year old male, with history of obstructive sleep apnea, who presents to the office for follow up evaluation. Reports no current respiratory symptoms or sleep symptoms. Using PAP on a nightly basis without difficulty. Reports no symptoms of daytime sleepiness. Getting supplies regularly.

## 2024-05-09 ENCOUNTER — APPOINTMENT (OUTPATIENT)
Dept: PULMONOLOGY | Facility: CLINIC | Age: 64
End: 2024-05-09
Payer: COMMERCIAL

## 2024-05-09 VITALS — DIASTOLIC BLOOD PRESSURE: 80 MMHG | OXYGEN SATURATION: 94 % | SYSTOLIC BLOOD PRESSURE: 146 MMHG | HEART RATE: 65 BPM

## 2024-05-09 DIAGNOSIS — R05.9 COUGH, UNSPECIFIED: ICD-10-CM

## 2024-05-09 DIAGNOSIS — G47.33 OBSTRUCTIVE SLEEP APNEA (ADULT) (PEDIATRIC): ICD-10-CM

## 2024-05-09 DIAGNOSIS — R06.00 DYSPNEA, UNSPECIFIED: ICD-10-CM

## 2024-05-09 DIAGNOSIS — J45.901 UNSPECIFIED ASTHMA WITH (ACUTE) EXACERBATION: ICD-10-CM

## 2024-05-09 DIAGNOSIS — I48.91 UNSPECIFIED ATRIAL FIBRILLATION: ICD-10-CM

## 2024-05-09 PROCEDURE — 94729 DIFFUSING CAPACITY: CPT

## 2024-05-09 PROCEDURE — 94010 BREATHING CAPACITY TEST: CPT

## 2024-05-09 PROCEDURE — 95012 NITRIC OXIDE EXP GAS DETER: CPT

## 2024-05-09 PROCEDURE — 94727 GAS DIL/WSHOT DETER LNG VOL: CPT

## 2024-05-09 PROCEDURE — 88738 HGB QUANT TRANSCUTANEOUS: CPT

## 2024-05-09 PROCEDURE — ZZZZZ: CPT

## 2024-05-09 PROCEDURE — 99214 OFFICE O/P EST MOD 30 MIN: CPT | Mod: 25

## 2024-05-09 NOTE — PHYSICAL EXAM
[Normal Oropharynx] : normal oropharynx [Heart Rate And Rhythm] : heart rate was normal and rhythm regular [Heart Sounds] : normal S1 and S2 [Heart Sounds Gallop] : no gallops [Murmurs] : no murmurs [Heart Sounds Pericardial Friction Rub] : no pericardial rub [Auscultation Breath Sounds / Voice Sounds] : lungs were clear to auscultation bilaterally [Bowel Sounds] : normal bowel sounds [Abdomen Soft] : soft [Abdomen Tenderness] : non-tender [Abdomen Mass (___ Cm)] : no abdominal mass palpated [Abnormal Walk] : normal gait [Musculoskeletal - Swelling] : no joint swelling seen [Motor Tone] : muscle strength and tone were normal [Nail Clubbing] : no clubbing of the fingernails [Cyanosis, Localized] : no localized cyanosis [Petechial Hemorrhages (___cm)] : no petechial hemorrhages [] : no ischemic changes Cephalexin Counseling: I counseled the patient regarding use of cephalexin as an antibiotic for prophylactic and/or therapeutic purposes. Cephalexin (commonly prescribed under brand name Keflex) is a cephalosporin antibiotic which is active against numerous classes of bacteria, including most skin bacteria. Side effects may include nausea, diarrhea, gastrointestinal upset, rash, hives, yeast infections, and in rare cases, hepatitis, kidney disease, seizures, fever, confusion, neurologic symptoms, and others. Patients with severe allergies to penicillin medications are cautioned that there is about a 10% incidence of cross-reactivity with cephalosporins. When possible, patients with penicillin allergies should use alternatives to cephalosporins for antibiotic therapy.

## 2024-05-09 NOTE — REASON FOR VISIT
[Cough] : cough [Shortness of Breath] : shortness of breath [Follow-Up] : a follow-up visit [Asthma] : asthma [Hypersomnolence] : hypersomnolence  [Sleep Apnea] : sleep apnea

## 2024-05-11 PROBLEM — J45.901 ASTHMA EXACERBATION: Status: ACTIVE | Noted: 2024-05-11

## 2024-05-11 PROBLEM — G47.33 OSA (OBSTRUCTIVE SLEEP APNEA): Status: ACTIVE | Noted: 2018-07-12

## 2024-05-11 NOTE — PROCEDURE
[FreeTextEntry1] : Pulmonary Function Test obtained in office today which revealed: Spirometry: Within normal limits with some improvement post bronchodilator, Lung Volume: Within normal limits, Diffusion: Within normal limits.  __  Exhaled Nitric Oxide             Final  No Documents Attached    	Test  	Result  	Flag	Reference	Goal	Last Verified  	Exhaled Nitric Oxide	19	 	 		REQUIRED  Ordered by: ЮЛИЯ AQUINO       Collected/Examined: [id number] 10:08AM       Verification Required       Stage: Final       Performed at: In Office       Performed by: FAVIO WILLINGHAM       Resulted: [id number] 10:08AM       Last Updated: [id number] 10:09AM

## 2024-05-11 NOTE — ASSESSMENT
[FreeTextEntry1] : Auto-titrating Positive Airway Pressure(APAP) compliance reviewed with patient in office today Patient is compliant and benefiting from APAP usage. Obtained and reviewed pulmonary function test NIOX results with patient today.  Start Z-Costa and prednisone taper for asthma exacerbation. Continue Dulera 2 puffs twice a day for asthma. Return for sleep follow up in 3 months.

## 2024-05-11 NOTE — DISCUSSION/SUMMARY
[FreeTextEntry1] : Cough secondary to asthma exacerbation.  History of obstructive sleep apnea on nocturnal CPAP.

## 2024-05-11 NOTE — HISTORY OF PRESENT ILLNESS
[TextBox_4] : Patient complains of chest tightness for 1.5 weeks [FreeTextEntry1] : DARRION HU is a 63 year old male, with history of obstructive sleep apnea, who presents to the office for follow up evaluation. Reports no current respiratory symptoms or sleep symptoms. Using PAP on a nightly basis without difficulty. Reports no symptoms of daytime sleepiness. Getting supplies regularly.

## 2024-05-12 LAB — POCT - HEMOGLOBIN (HGB), QUANTITATIVE, TRANSCUTANEOUS: 16.3

## 2024-05-20 RX ORDER — PREDNISONE 10 MG/1
10 TABLET ORAL
Qty: 12 | Refills: 0 | Status: ACTIVE | COMMUNITY
Start: 2024-05-09 | End: 1900-01-01

## 2024-05-20 RX ORDER — DOXYCYCLINE HYCLATE 100 MG/1
100 CAPSULE ORAL
Qty: 14 | Refills: 0 | Status: ACTIVE | COMMUNITY
Start: 2024-05-09 | End: 1900-01-01

## 2024-08-27 ENCOUNTER — APPOINTMENT (OUTPATIENT)
Dept: PULMONOLOGY | Facility: CLINIC | Age: 64
End: 2024-08-27
Payer: MEDICARE

## 2024-08-27 VITALS — OXYGEN SATURATION: 98 % | DIASTOLIC BLOOD PRESSURE: 94 MMHG | SYSTOLIC BLOOD PRESSURE: 159 MMHG | HEART RATE: 61 BPM

## 2024-08-27 DIAGNOSIS — I48.91 UNSPECIFIED ATRIAL FIBRILLATION: ICD-10-CM

## 2024-08-27 DIAGNOSIS — J45.40 MODERATE PERSISTENT ASTHMA, UNCOMPLICATED: ICD-10-CM

## 2024-08-27 DIAGNOSIS — R06.00 DYSPNEA, UNSPECIFIED: ICD-10-CM

## 2024-08-27 DIAGNOSIS — G47.33 OBSTRUCTIVE SLEEP APNEA (ADULT) (PEDIATRIC): ICD-10-CM

## 2024-08-27 PROCEDURE — 94727 GAS DIL/WSHOT DETER LNG VOL: CPT

## 2024-08-27 PROCEDURE — 94010 BREATHING CAPACITY TEST: CPT

## 2024-08-27 PROCEDURE — 94729 DIFFUSING CAPACITY: CPT

## 2024-08-27 PROCEDURE — 99214 OFFICE O/P EST MOD 30 MIN: CPT | Mod: 25

## 2024-08-27 PROCEDURE — ZZZZZ: CPT

## 2024-08-27 PROCEDURE — 95012 NITRIC OXIDE EXP GAS DETER: CPT

## 2024-08-27 NOTE — PROCEDURE
[FreeTextEntry1] : Pulmonary Function Test obtained in office today which revealed: Spirometry: Within normal limits, Lung Volume: Within normal limits, Diffusion: Within normal limits.  __  Exhaled Nitric Oxide             Final  No Documents Attached    	Test  	Result  	Flag	Reference	Goal	Last Verified  	Exhaled Nitric Oxide	21	 	 		REQUIRED  Ordered by: ЮЛИЯ AQUINO       Collected/Examined: [id number] 01:35PM       Verification Required       Stage: Final       Performed at: In Office       Performed by: MELIZA BEE       Resulted: [id number] 01:35PM       Last Updated: [id number] 01:35PM

## 2024-08-27 NOTE — HISTORY OF PRESENT ILLNESS
[FreeTextEntry1] : DARRION HU is a 63 year old male, with history of obstructive sleep apnea, who presents to the office for follow up evaluation. Reports no current respiratory symptoms or sleep symptoms. Using PAP on a nightly basis without difficulty. Reports no symptoms of daytime sleepiness. Getting supplies regularly.  has lost some weight no sob  some nasal congestion due for replacement machine [employer] nasal pillows on Dulara rare albuterol

## 2024-08-27 NOTE — PROCEDURE
[FreeTextEntry1] : Pulmonary Function Test obtained in office today which revealed: Spirometry: Within normal limits, Lung Volume: Within normal limits, Diffusion: Within normal limits.  __  Exhaled Nitric Oxide             Final  No Documents Attached    	Test  	Result  	Flag	Reference	Goal	Last Verified  	Exhaled Nitric Oxide	21	 	 		REQUIRED  Ordered by: ЮЛИЯ AQUINO       Collected/Examined: [id number] 01:35PM       Verification Required       Stage: Final       Performed at: In Office       Performed by: MELIZA BEE       Resulted: [id number] 01:35PM       Last Updated: [id number] 01:35PM

## 2024-08-27 NOTE — ASSESSMENT
[FreeTextEntry1] : Auto-titrating Positive Airway Pressure(APAP) compliance reviewed with patient in office today Patient is compliant and benefiting from APAP usage. Obtained and reviewed pulmonary function test NIOX results with patient today.   Continue Dulera 2 puffs twice a day for asthma. 2-night HSS Return for pulmonary follow-up in 3 months.

## 2024-08-27 NOTE — DISCUSSION/SUMMARY
[FreeTextEntry1] : obstructive sleep apnea on nocturnal CPAP. Asthma meets goals. Denies nocturnal symptoms.Denies significant cough,wheeze,SOB. Rare beta agonist use

## 2024-09-06 ENCOUNTER — APPOINTMENT (OUTPATIENT)
Dept: PULMONOLOGY | Facility: CLINIC | Age: 64
End: 2024-09-06
Payer: MEDICARE

## 2024-09-07 PROCEDURE — 95800 SLP STDY UNATTENDED: CPT | Mod: 52

## 2024-09-08 PROCEDURE — 95800 SLP STDY UNATTENDED: CPT

## 2024-09-24 ENCOUNTER — APPOINTMENT (OUTPATIENT)
Dept: PULMONOLOGY | Facility: CLINIC | Age: 64
End: 2024-09-24
Payer: MEDICARE

## 2024-09-24 VITALS
HEART RATE: 79 BPM | HEIGHT: 73 IN | SYSTOLIC BLOOD PRESSURE: 143 MMHG | BODY MASS INDEX: 38.83 KG/M2 | OXYGEN SATURATION: 96 % | RESPIRATION RATE: 17 BRPM | WEIGHT: 293 LBS | DIASTOLIC BLOOD PRESSURE: 73 MMHG

## 2024-09-24 DIAGNOSIS — J45.40 MODERATE PERSISTENT ASTHMA, UNCOMPLICATED: ICD-10-CM

## 2024-09-24 DIAGNOSIS — R06.00 DYSPNEA, UNSPECIFIED: ICD-10-CM

## 2024-09-24 DIAGNOSIS — G47.33 OBSTRUCTIVE SLEEP APNEA (ADULT) (PEDIATRIC): ICD-10-CM

## 2024-09-24 PROCEDURE — 99214 OFFICE O/P EST MOD 30 MIN: CPT

## 2024-09-24 NOTE — REASON FOR VISIT
[Hypersomnolence] : hypersomnolence  [Follow-Up] : a follow-up visit [Asthma] : asthma [Sleep Apnea] : sleep apnea [Cough] : cough [Shortness of Breath] : shortness of breath

## 2024-09-25 NOTE — ASSESSMENT
[FreeTextEntry1] : Dr. Savage downloaded and reviewed with Brent his lab sleep study with him in office today. Study showed that Brent had a AHI of 72   Continue Dulera 2 puffs twice a day for asthma.  2-night HSS showed very severe obstructive sleep apnea with overall AHI of 72 events per hour sleep.  Dr. Savage's assessment: Brent is suffering from severe sleep apnea.  Dr. Savage's recommendation: Brent should begin using bipap machine - traditional CPAP therapy will not work anymore as his sleep apnea is too severe. To set it up, Brent needs a BiPAP titration study titration study.  Brent should return to see Dr. Savage two weeks after his titration study.  Auto-titrating Positive Airway Pressure(APAP) compliance reviewed with patient in office today. Patient is compliant and benefiting from APAP usage.

## 2024-09-25 NOTE — HISTORY OF PRESENT ILLNESS
[FreeTextEntry1] : BRENT HU is a 63 year old male, with history of obstructive sleep apnea, who presents to the office for follow up evaluation. Reports no current respiratory symptoms or sleep symptoms. Brent is here to review his home sleep test study.  Brent reports that due to his elevated AHI he recently took a stress test which showed that he was doing well despite the elevated AHI.   Brent notes he already received his flu shot.  has lost some weight no sob  some nasal congestion slight chest pain due for replacement machine [employer] nasal pillows on Dulara rare albuterol

## 2024-09-25 NOTE — ADDENDUM
[FreeTextEntry1] : I, Julius Latonya, acted solely as a scribe for Dr. Tena Savage D.O. on this date 09/24/2024.   All medical record entries made by the Scribe were at my, Dr. Tena Savage D.O., direction and personally dictated by me on 09/24/2024. I have reviewed the chart and agree that the record accurately reflects my personal performance of the history, physical exam, assessment and plan. I have also personally directed, reviewed, and agreed with the chart.

## 2024-10-07 ENCOUNTER — RX RENEWAL (OUTPATIENT)
Age: 64
End: 2024-10-07

## 2024-11-26 ENCOUNTER — APPOINTMENT (OUTPATIENT)
Dept: PULMONOLOGY | Facility: CLINIC | Age: 64
End: 2024-11-26
Payer: MEDICARE

## 2024-11-26 VITALS — SYSTOLIC BLOOD PRESSURE: 132 MMHG | DIASTOLIC BLOOD PRESSURE: 77 MMHG | OXYGEN SATURATION: 97 % | HEART RATE: 56 BPM

## 2024-11-26 PROCEDURE — 99214 OFFICE O/P EST MOD 30 MIN: CPT | Mod: 25

## 2024-11-26 PROCEDURE — 94729 DIFFUSING CAPACITY: CPT

## 2024-11-26 PROCEDURE — 94727 GAS DIL/WSHOT DETER LNG VOL: CPT

## 2024-11-26 PROCEDURE — ZZZZZ: CPT

## 2024-11-26 PROCEDURE — 94010 BREATHING CAPACITY TEST: CPT

## 2024-11-26 PROCEDURE — 95012 NITRIC OXIDE EXP GAS DETER: CPT

## 2025-02-25 ENCOUNTER — APPOINTMENT (OUTPATIENT)
Dept: PULMONOLOGY | Facility: CLINIC | Age: 65
End: 2025-02-25
Payer: MEDICARE

## 2025-02-25 ENCOUNTER — NON-APPOINTMENT (OUTPATIENT)
Age: 65
End: 2025-02-25

## 2025-02-25 VITALS — HEART RATE: 61 BPM | DIASTOLIC BLOOD PRESSURE: 72 MMHG | SYSTOLIC BLOOD PRESSURE: 153 MMHG | OXYGEN SATURATION: 95 %

## 2025-02-25 DIAGNOSIS — R05.9 COUGH, UNSPECIFIED: ICD-10-CM

## 2025-02-25 DIAGNOSIS — J45.40 MODERATE PERSISTENT ASTHMA, UNCOMPLICATED: ICD-10-CM

## 2025-02-25 DIAGNOSIS — G47.33 OBSTRUCTIVE SLEEP APNEA (ADULT) (PEDIATRIC): ICD-10-CM

## 2025-02-25 PROCEDURE — 99214 OFFICE O/P EST MOD 30 MIN: CPT | Mod: 25

## 2025-02-25 PROCEDURE — 94727 GAS DIL/WSHOT DETER LNG VOL: CPT

## 2025-02-25 PROCEDURE — 95012 NITRIC OXIDE EXP GAS DETER: CPT

## 2025-02-25 PROCEDURE — 94010 BREATHING CAPACITY TEST: CPT

## 2025-02-25 PROCEDURE — 94729 DIFFUSING CAPACITY: CPT

## 2025-02-25 PROCEDURE — ZZZZZ: CPT

## 2025-08-26 ENCOUNTER — APPOINTMENT (OUTPATIENT)
Dept: PULMONOLOGY | Facility: CLINIC | Age: 65
End: 2025-08-26
Payer: MEDICARE

## 2025-08-26 VITALS
SYSTOLIC BLOOD PRESSURE: 133 MMHG | BODY MASS INDEX: 38.53 KG/M2 | DIASTOLIC BLOOD PRESSURE: 77 MMHG | HEIGHT: 73.5 IN | RESPIRATION RATE: 18 BRPM | WEIGHT: 297 LBS | OXYGEN SATURATION: 95 % | HEART RATE: 67 BPM

## 2025-08-26 DIAGNOSIS — I48.91 UNSPECIFIED ATRIAL FIBRILLATION: ICD-10-CM

## 2025-08-26 DIAGNOSIS — G47.33 OBSTRUCTIVE SLEEP APNEA (ADULT) (PEDIATRIC): ICD-10-CM

## 2025-08-26 DIAGNOSIS — J45.40 MODERATE PERSISTENT ASTHMA, UNCOMPLICATED: ICD-10-CM

## 2025-08-26 PROCEDURE — 99214 OFFICE O/P EST MOD 30 MIN: CPT

## 2025-08-26 PROCEDURE — G2211 COMPLEX E/M VISIT ADD ON: CPT
